# Patient Record
Sex: MALE | Race: WHITE | Employment: OTHER | URBAN - METROPOLITAN AREA
[De-identification: names, ages, dates, MRNs, and addresses within clinical notes are randomized per-mention and may not be internally consistent; named-entity substitution may affect disease eponyms.]

---

## 2018-12-11 RX ORDER — LISINOPRIL 5 MG/1
TABLET ORAL
Qty: 30 TABLET | Refills: 11 | Status: SHIPPED | OUTPATIENT
Start: 2018-12-11 | End: 2019-06-29 | Stop reason: SDUPTHER

## 2019-03-04 ENCOUNTER — OFFICE VISIT (OUTPATIENT)
Dept: CARDIOLOGY CLINIC | Age: 64
End: 2019-03-04
Payer: MEDICARE

## 2019-03-04 VITALS
HEART RATE: 68 BPM | SYSTOLIC BLOOD PRESSURE: 118 MMHG | RESPIRATION RATE: 16 BRPM | HEIGHT: 72 IN | WEIGHT: 289.6 LBS | BODY MASS INDEX: 39.22 KG/M2 | DIASTOLIC BLOOD PRESSURE: 68 MMHG

## 2019-03-04 DIAGNOSIS — E78.5 DYSLIPIDEMIA: ICD-10-CM

## 2019-03-04 DIAGNOSIS — I25.5 ISCHEMIC CARDIOMYOPATHY: ICD-10-CM

## 2019-03-04 DIAGNOSIS — I25.10 CORONARY ARTERY DISEASE INVOLVING NATIVE CORONARY ARTERY OF NATIVE HEART WITHOUT ANGINA PECTORIS: ICD-10-CM

## 2019-03-04 DIAGNOSIS — Z98.61 HISTORY OF PTCA: ICD-10-CM

## 2019-03-04 DIAGNOSIS — E66.01 MORBID OBESITY DUE TO EXCESS CALORIES (HCC): ICD-10-CM

## 2019-03-04 DIAGNOSIS — Z86.73 H/O: CVA (CEREBROVASCULAR ACCIDENT): ICD-10-CM

## 2019-03-04 DIAGNOSIS — I10 ESSENTIAL HYPERTENSION: Primary | ICD-10-CM

## 2019-03-04 DIAGNOSIS — I25.2 HISTORY OF ACUTE ANTERIOR WALL MYOCARDIAL INFARCTION: ICD-10-CM

## 2019-03-04 PROCEDURE — G8598 ASA/ANTIPLAT THER USED: HCPCS | Performed by: INTERNAL MEDICINE

## 2019-03-04 PROCEDURE — 3017F COLORECTAL CA SCREEN DOC REV: CPT | Performed by: INTERNAL MEDICINE

## 2019-03-04 PROCEDURE — 1036F TOBACCO NON-USER: CPT | Performed by: INTERNAL MEDICINE

## 2019-03-04 PROCEDURE — G8484 FLU IMMUNIZE NO ADMIN: HCPCS | Performed by: INTERNAL MEDICINE

## 2019-03-04 PROCEDURE — 99214 OFFICE O/P EST MOD 30 MIN: CPT | Performed by: INTERNAL MEDICINE

## 2019-03-04 PROCEDURE — 93000 ELECTROCARDIOGRAM COMPLETE: CPT | Performed by: INTERNAL MEDICINE

## 2019-03-04 PROCEDURE — G8427 DOCREV CUR MEDS BY ELIG CLIN: HCPCS | Performed by: INTERNAL MEDICINE

## 2019-03-04 PROCEDURE — G8417 CALC BMI ABV UP PARAM F/U: HCPCS | Performed by: INTERNAL MEDICINE

## 2019-03-04 RX ORDER — OMEPRAZOLE 20 MG/1
20 CAPSULE, DELAYED RELEASE ORAL DAILY
COMMUNITY

## 2019-03-04 RX ORDER — NITROGLYCERIN 0.4 MG/1
TABLET SUBLINGUAL
Qty: 25 TABLET | Refills: 3 | Status: SHIPPED
Start: 2019-03-04 | End: 2022-08-05 | Stop reason: SDUPTHER

## 2019-03-08 DIAGNOSIS — I25.10 CORONARY ARTERY DISEASE INVOLVING NATIVE CORONARY ARTERY OF NATIVE HEART WITHOUT ANGINA PECTORIS: ICD-10-CM

## 2019-03-08 DIAGNOSIS — I25.2 HISTORY OF ACUTE ANTERIOR WALL MYOCARDIAL INFARCTION: ICD-10-CM

## 2019-07-01 RX ORDER — LISINOPRIL 5 MG/1
TABLET ORAL
Qty: 90 TABLET | Refills: 3 | Status: SHIPPED
Start: 2019-07-01 | End: 2020-05-27

## 2019-12-09 ENCOUNTER — APPOINTMENT (OUTPATIENT)
Dept: CT IMAGING | Age: 64
End: 2019-12-09
Payer: MEDICARE

## 2019-12-09 ENCOUNTER — HOSPITAL ENCOUNTER (EMERGENCY)
Age: 64
Discharge: HOME | End: 2019-12-09
Attending: EMERGENCY MEDICINE
Payer: MEDICARE

## 2019-12-09 VITALS
HEART RATE: 77 BPM | HEIGHT: 72 IN | TEMPERATURE: 97.7 F | RESPIRATION RATE: 14 BRPM | WEIGHT: 280 LBS | OXYGEN SATURATION: 93 % | DIASTOLIC BLOOD PRESSURE: 105 MMHG | SYSTOLIC BLOOD PRESSURE: 170 MMHG | BODY MASS INDEX: 37.93 KG/M2

## 2019-12-09 DIAGNOSIS — N20.0 KIDNEY STONE: Primary | ICD-10-CM

## 2019-12-09 LAB
ALBUMIN SERPL-MCNC: 4 G/DL (ref 3.5–5.2)
ALP BLD-CCNC: 142 U/L (ref 40–129)
ALT SERPL-CCNC: 23 U/L (ref 0–40)
AMORPHOUS: ABNORMAL
ANION GAP SERPL CALCULATED.3IONS-SCNC: 11 MMOL/L (ref 7–16)
AST SERPL-CCNC: 16 U/L (ref 0–39)
BACTERIA: ABNORMAL /HPF
BASOPHILS ABSOLUTE: 0.02 E9/L (ref 0–0.2)
BASOPHILS RELATIVE PERCENT: 0.2 % (ref 0–2)
BILIRUB SERPL-MCNC: 0.8 MG/DL (ref 0–1.2)
BILIRUBIN URINE: NEGATIVE
BLOOD, URINE: ABNORMAL
BUN BLDV-MCNC: 34 MG/DL (ref 8–23)
CALCIUM SERPL-MCNC: 9.8 MG/DL (ref 8.6–10.2)
CHLORIDE BLD-SCNC: 100 MMOL/L (ref 98–107)
CLARITY: CLEAR
CO2: 30 MMOL/L (ref 22–29)
COLOR: YELLOW
CREAT SERPL-MCNC: 1 MG/DL (ref 0.7–1.2)
EOSINOPHILS ABSOLUTE: 0 E9/L (ref 0.05–0.5)
EOSINOPHILS RELATIVE PERCENT: 0 % (ref 0–6)
GFR AFRICAN AMERICAN: >60
GFR NON-AFRICAN AMERICAN: >60 ML/MIN/1.73
GLUCOSE BLD-MCNC: 171 MG/DL (ref 74–99)
GLUCOSE URINE: NEGATIVE MG/DL
HCT VFR BLD CALC: 48 % (ref 37–54)
HEMOGLOBIN: 15.8 G/DL (ref 12.5–16.5)
IMMATURE GRANULOCYTES #: 0.06 E9/L
IMMATURE GRANULOCYTES %: 0.5 % (ref 0–5)
KETONES, URINE: NEGATIVE MG/DL
LEUKOCYTE ESTERASE, URINE: NEGATIVE
LIPASE: 26 U/L (ref 13–60)
LYMPHOCYTES ABSOLUTE: 0.51 E9/L (ref 1.5–4)
LYMPHOCYTES RELATIVE PERCENT: 3.9 % (ref 20–42)
MCH RBC QN AUTO: 29.4 PG (ref 26–35)
MCHC RBC AUTO-ENTMCNC: 32.9 % (ref 32–34.5)
MCV RBC AUTO: 89.2 FL (ref 80–99.9)
MONOCYTES ABSOLUTE: 0.66 E9/L (ref 0.1–0.95)
MONOCYTES RELATIVE PERCENT: 5 % (ref 2–12)
NEUTROPHILS ABSOLUTE: 11.89 E9/L (ref 1.8–7.3)
NEUTROPHILS RELATIVE PERCENT: 90.4 % (ref 43–80)
NITRITE, URINE: NEGATIVE
PDW BLD-RTO: 12.6 FL (ref 11.5–15)
PH UA: 5.5 (ref 5–9)
PLATELET # BLD: 215 E9/L (ref 130–450)
PMV BLD AUTO: 10.2 FL (ref 7–12)
POTASSIUM REFLEX MAGNESIUM: 4.7 MMOL/L (ref 3.5–5)
PROTEIN UA: NEGATIVE MG/DL
RBC # BLD: 5.38 E12/L (ref 3.8–5.8)
RBC UA: ABNORMAL /HPF (ref 0–2)
SODIUM BLD-SCNC: 141 MMOL/L (ref 132–146)
SPECIFIC GRAVITY UA: >=1.03 (ref 1–1.03)
TOTAL PROTEIN: 7.6 G/DL (ref 6.4–8.3)
UROBILINOGEN, URINE: 0.2 E.U./DL
WBC # BLD: 13.1 E9/L (ref 4.5–11.5)
WBC UA: ABNORMAL /HPF (ref 0–5)

## 2019-12-09 PROCEDURE — 80053 COMPREHEN METABOLIC PANEL: CPT

## 2019-12-09 PROCEDURE — 99284 EMERGENCY DEPT VISIT MOD MDM: CPT

## 2019-12-09 PROCEDURE — 85025 COMPLETE CBC W/AUTO DIFF WBC: CPT

## 2019-12-09 PROCEDURE — 6360000002 HC RX W HCPCS: Performed by: STUDENT IN AN ORGANIZED HEALTH CARE EDUCATION/TRAINING PROGRAM

## 2019-12-09 PROCEDURE — 74176 CT ABD & PELVIS W/O CONTRAST: CPT

## 2019-12-09 PROCEDURE — 6370000000 HC RX 637 (ALT 250 FOR IP): Performed by: EMERGENCY MEDICINE

## 2019-12-09 PROCEDURE — 96374 THER/PROPH/DIAG INJ IV PUSH: CPT

## 2019-12-09 PROCEDURE — 6360000002 HC RX W HCPCS: Performed by: EMERGENCY MEDICINE

## 2019-12-09 PROCEDURE — 81001 URINALYSIS AUTO W/SCOPE: CPT

## 2019-12-09 PROCEDURE — 96375 TX/PRO/DX INJ NEW DRUG ADDON: CPT

## 2019-12-09 PROCEDURE — 83690 ASSAY OF LIPASE: CPT

## 2019-12-09 RX ORDER — KETOROLAC TROMETHAMINE 30 MG/ML
15 INJECTION, SOLUTION INTRAMUSCULAR; INTRAVENOUS ONCE
Status: DISCONTINUED | OUTPATIENT
Start: 2019-12-09 | End: 2019-12-09

## 2019-12-09 RX ORDER — TAMSULOSIN HYDROCHLORIDE 0.4 MG/1
0.4 CAPSULE ORAL DAILY
Qty: 7 CAPSULE | Refills: 0 | Status: SHIPPED | OUTPATIENT
Start: 2019-12-09 | End: 2020-11-03

## 2019-12-09 RX ORDER — HYDROCODONE BITARTRATE AND ACETAMINOPHEN 5; 325 MG/1; MG/1
1 TABLET ORAL ONCE
Status: COMPLETED | OUTPATIENT
Start: 2019-12-09 | End: 2019-12-09

## 2019-12-09 RX ORDER — MORPHINE SULFATE 4 MG/ML
4 INJECTION, SOLUTION INTRAMUSCULAR; INTRAVENOUS ONCE
Status: COMPLETED | OUTPATIENT
Start: 2019-12-09 | End: 2019-12-09

## 2019-12-09 RX ORDER — METOCLOPRAMIDE HYDROCHLORIDE 5 MG/ML
10 INJECTION INTRAMUSCULAR; INTRAVENOUS ONCE
Status: COMPLETED | OUTPATIENT
Start: 2019-12-09 | End: 2019-12-09

## 2019-12-09 RX ORDER — ONDANSETRON 8 MG/1
8 TABLET, ORALLY DISINTEGRATING ORAL EVERY 8 HOURS PRN
Qty: 12 TABLET | Refills: 0 | Status: SHIPPED | OUTPATIENT
Start: 2019-12-09 | End: 2020-12-08 | Stop reason: ALTCHOICE

## 2019-12-09 RX ORDER — HYDROCODONE BITARTRATE AND ACETAMINOPHEN 5; 325 MG/1; MG/1
1 TABLET ORAL EVERY 4 HOURS PRN
Qty: 18 TABLET | Refills: 0 | Status: SHIPPED | OUTPATIENT
Start: 2019-12-09 | End: 2019-12-12

## 2019-12-09 RX ORDER — ONDANSETRON 2 MG/ML
4 INJECTION INTRAMUSCULAR; INTRAVENOUS ONCE
Status: COMPLETED | OUTPATIENT
Start: 2019-12-09 | End: 2019-12-09

## 2019-12-09 RX ADMIN — HYDROCODONE BITARTRATE AND ACETAMINOPHEN 1 TABLET: 5; 325 TABLET ORAL at 05:06

## 2019-12-09 RX ADMIN — MORPHINE SULFATE 4 MG: 4 INJECTION, SOLUTION INTRAMUSCULAR; INTRAVENOUS at 04:06

## 2019-12-09 RX ADMIN — ONDANSETRON 4 MG: 2 INJECTION INTRAMUSCULAR; INTRAVENOUS at 04:06

## 2019-12-09 RX ADMIN — METOCLOPRAMIDE 10 MG: 5 INJECTION, SOLUTION INTRAMUSCULAR; INTRAVENOUS at 06:08

## 2019-12-09 ASSESSMENT — ENCOUNTER SYMPTOMS
VOMITING: 1
DIARRHEA: 0
PHOTOPHOBIA: 0
SHORTNESS OF BREATH: 0
HEMATOCHEZIA: 0
CHEST TIGHTNESS: 0
COUGH: 0
ABDOMINAL PAIN: 1
NAUSEA: 1
WHEEZING: 0
HEMATEMESIS: 0
CONSTIPATION: 0

## 2019-12-09 ASSESSMENT — PAIN DESCRIPTION - FREQUENCY: FREQUENCY: CONTINUOUS

## 2019-12-09 ASSESSMENT — PAIN DESCRIPTION - DIRECTION: RADIATING_TOWARDS: L FLANK

## 2019-12-09 ASSESSMENT — PAIN SCALES - GENERAL
PAINLEVEL_OUTOF10: 8
PAINLEVEL_OUTOF10: 4
PAINLEVEL_OUTOF10: 4
PAINLEVEL_OUTOF10: 8
PAINLEVEL_OUTOF10: 4
PAINLEVEL_OUTOF10: 4

## 2019-12-09 ASSESSMENT — PAIN DESCRIPTION - ONSET: ONSET: SUDDEN

## 2019-12-09 ASSESSMENT — PAIN DESCRIPTION - PAIN TYPE: TYPE: ACUTE PAIN

## 2019-12-09 ASSESSMENT — PAIN DESCRIPTION - LOCATION: LOCATION: ABDOMEN

## 2019-12-09 ASSESSMENT — PAIN DESCRIPTION - ORIENTATION: ORIENTATION: LOWER

## 2019-12-09 ASSESSMENT — PAIN DESCRIPTION - PROGRESSION: CLINICAL_PROGRESSION: GRADUALLY WORSENING

## 2019-12-09 ASSESSMENT — PAIN DESCRIPTION - DESCRIPTORS: DESCRIPTORS: ACHING;CONSTANT;DISCOMFORT

## 2020-03-20 ENCOUNTER — OFFICE VISIT (OUTPATIENT)
Dept: CARDIOLOGY CLINIC | Age: 65
End: 2020-03-20
Payer: MEDICARE

## 2020-03-20 VITALS
WEIGHT: 292.2 LBS | RESPIRATION RATE: 20 BRPM | HEART RATE: 60 BPM | DIASTOLIC BLOOD PRESSURE: 62 MMHG | BODY MASS INDEX: 39.58 KG/M2 | SYSTOLIC BLOOD PRESSURE: 120 MMHG | HEIGHT: 72 IN

## 2020-03-20 PROCEDURE — 93000 ELECTROCARDIOGRAM COMPLETE: CPT | Performed by: INTERNAL MEDICINE

## 2020-03-20 PROCEDURE — 99214 OFFICE O/P EST MOD 30 MIN: CPT | Performed by: INTERNAL MEDICINE

## 2020-03-20 NOTE — PROGRESS NOTES
OFFICE VISIT        PRIMARY CARE PHYSICIAN:    Krishan Meade MD       ALLERGIES / SENSITIVITIES:    No Known Allergies       REVIEWED MEDICATIONS:      Current Outpatient Medications:     FIBER PO, Take by mouth daily, Disp: , Rfl:     ondansetron (ZOFRAN ODT) 8 MG TBDP disintegrating tablet, Take 1 tablet by mouth every 8 hours as needed for Nausea, Disp: 12 tablet, Rfl: 0    lisinopril (PRINIVIL;ZESTRIL) 5 MG tablet, TAKE 1 TABLET BY MOUTH DAILY, Disp: 90 tablet, Rfl: 3    omeprazole (PRILOSEC) 20 MG delayed release capsule, Take 20 mg by mouth daily, Disp: , Rfl:     nitroGLYCERIN (NITROSTAT) 0.4 MG SL tablet, Dissolve 1 tablet under tongue for chest pain and repeat every 5 min up to max of 3 total doses. If no relief after 3 doses call 911, Disp: 25 tablet, Rfl: 3    atorvastatin (LIPITOR) 80 MG tablet, Take 1 tablet by mouth nightly, Disp: 30 tablet, Rfl: 3    metoprolol succinate (TOPROL XL) 25 MG extended release tablet, Take 1 tablet by mouth daily, Disp: 30 tablet, Rfl: 3    citalopram (CELEXA) 20 MG tablet, Take 20 mg by mouth nightly, Disp: , Rfl:     aspirin 81 MG chewable tablet, Take 81 mg by mouth daily , Disp: , Rfl:     Omega-3 Fatty Acids (FISH OIL) 600 MG CAPS, Take 1,200 mg by mouth 2 times daily , Disp: , Rfl:     Ascorbic Acid (VITAMIN C) 500 MG tablet, Take 500 mg by mouth daily , Disp: , Rfl:     tamsulosin (FLOMAX) 0.4 MG capsule, Take 1 capsule by mouth daily (Patient not taking: Reported on 3/20/2020), Disp: 7 capsule, Rfl: 0      S: REASON FOR VISIT:    Coronary artery disease/ischemic cardiomyopathy. [facility] is a 73-year-old male with cardiovascular history as described below. He is morbidly obese and has gained more weight. He is reporting right knee discomfort from reportedly torn right knee meniscus, which he had injected at [facility] without any significant improvement. He was in the Emergency Room in December with a kidney stone. He denies chest pain.   He  No S3 or S4.  No murmurs or rubs. Abdomen:                    Soft, nontender without organomegaly or masses.  No bruits.  Normal bowel sounds. Extremities:                 One plus edema.  Pulses normal.  Skin:                            Normal turgor.  No rashes or ulcers noted. Neurologic:                  Oriented x3.  No motor or sensory deficits detected. REVIEW OF DIAGNOSTIC TESTS:    1. Blood tests from 12/9/2019 reviewed:  Lipase normal.  Glucose 171. BUN 34, creatinine 1.0, GFR > 60, potassium 4.7. ALT and AST normal.  WBC 13.1, hemoglobin 13.8, hematocrit 48.0. Platelet count 132.    2.  EKG done today showed sinus bradycardia with a heart rate of 59 bpm with RSR prime in V1, but was otherwise unremarkable. ASSESSMENT / DIAGNOSIS:   1.  Coronary artery disease/ischemic cardiomyopathy:  Clinically stable with no angina or signs/symptoms of CHF. 2.  Morbid obesity, with continued weight gain. 3.  Hypertension: Adequately controlled. 4.  History of CVA. 5.  History of low HDL:  On statin therapy. 6.  Right knee torn meniscus with right knee discomfort with history of bilateral knee surgeries in the remote past.  7.  History of kidney stones. 8.  History of depression.          TREATMENT PLAN:  1. Reassure. 2.  May discontinue Brilinta. 3.  Patient strongly advised aerobic exercise, watching diet and attempt to lose weight. 4.  Consider a myocardial perfusion imaging study for follow up coronary artery disease: Will defer for now since patient is asymptomatic and in view of the ongoing problems with the Corona virus/Covid 19.  5.  Follow up with Cardiology in 1 year or on a prn basis. Kemar Puckett.  [facility] [address]  [phone]

## 2020-05-27 RX ORDER — LISINOPRIL 5 MG/1
TABLET ORAL
Qty: 90 TABLET | Refills: 3 | Status: SHIPPED
Start: 2020-05-27 | End: 2021-03-29 | Stop reason: SDUPTHER

## 2020-06-03 ENCOUNTER — HOSPITAL ENCOUNTER (OUTPATIENT)
Dept: NUCLEAR MEDICINE | Age: 65
Discharge: HOME | End: 2020-06-03
Payer: MEDICARE

## 2020-06-03 PROCEDURE — 78014 THYROID IMAGING W/BLOOD FLOW: CPT

## 2020-06-03 PROCEDURE — A9517 I131 IODIDE CAP, RX: HCPCS | Performed by: RADIOLOGY

## 2020-06-03 PROCEDURE — 3430000000 HC RX DIAGNOSTIC RADIOPHARMACEUTICAL: Performed by: RADIOLOGY

## 2020-06-03 RX ADMIN — SODIUM IODIDE I 131 15 MICRO CURIE: 1 CAPSULE, GELATIN COATED ORAL at 11:30

## 2020-06-04 ENCOUNTER — HOSPITAL ENCOUNTER (OUTPATIENT)
Dept: NUCLEAR MEDICINE | Age: 65
Discharge: HOME | End: 2020-06-04
Payer: MEDICARE

## 2020-06-04 PROCEDURE — 3430000000 HC RX DIAGNOSTIC RADIOPHARMACEUTICAL: Performed by: RADIOLOGY

## 2020-06-04 PROCEDURE — A9512 TC99M PERTECHNETATE: HCPCS | Performed by: RADIOLOGY

## 2020-06-04 RX ADMIN — Medication 10 MILLICURIE: at 11:54

## 2020-10-12 ENCOUNTER — HOSPITAL ENCOUNTER (OUTPATIENT)
Dept: ULTRASOUND IMAGING | Age: 65
Discharge: HOME | End: 2020-10-14
Payer: MEDICARE

## 2020-10-12 PROCEDURE — 76536 US EXAM OF HEAD AND NECK: CPT

## 2020-10-22 ENCOUNTER — HOSPITAL ENCOUNTER (OUTPATIENT)
Dept: SLEEP CENTER | Age: 65
Discharge: HOME | End: 2020-10-22
Payer: MEDICARE

## 2020-10-22 PROCEDURE — G0399 HOME SLEEP TEST/TYPE 3 PORTA: HCPCS

## 2020-10-24 NOTE — PROGRESS NOTES
[address] [address]                               SLEEP STUDY REPORT    PATIENT NAME: Zackary Kaufman                 :        1955  MED REC NO:   [id number]                            ROOM:  ACCOUNT NO:   [de-identified]                           ADMIT DATE: 10/22/2020  PROVIDER:     Britney White MD    DATE OF STUDY:  10/22/2020    REFERRING PROVIDER:  Thompson Shen NP    STUDY PERFORMED:  Polysomnography. INDICATION FOR STUDY:  Loud snoring, restless sleep, and morning  headaches. CURRENT MEDICATIONS:  Metoprolol, atorvastatin, lisinopril, citalopram,  Prilosec, and vitamins. INTERPRETATION:  This polysomnogram was performed as a home sleep apnea  test.  An [facility] monitoring device was utilized for the study. Total recording time was 543 minutes. RESPIRATION SUMMARY:  APNEA:  There were 85 apneic events, which were all obstructive, five  occurred in the supine position and maximum duration was 50 seconds. HYPOPNEA:  There were 229 hypopneic events, 29 occurred in the supine  position and maximum duration was 24 seconds. RESPIRATORY EVENT INDEX:  The respiratory event index is 36. OXYGEN SATURATION:  Average oxygen saturation was 87%. Lowest  saturation was 71%. The patient spent 63% of the time with saturation  less than 90%. One percent of the time, saturation was less than 80%. HEART RATE SUMMARY:  Average heart rate during sleep was 66 beats per  minute. Maximum heart rate was 135 beats per minute and minimum heart  rate was 54 beats per minute. SNORE SUMMARY:  There were 39 snore episodes, this amounted to  approximately 1% of the night snoring. MISCELLANEOUS:  [place] Sleepiness Scale score is 13/24. BMI is 37.97  kg per meter squared. Neck circumference is 19.5 inches. IMPRESSION:  1. Severe obstructive sleep apnea. 2.  Mild snoring.   3.  Nocturnal hypoxia. PLAN:  1. Auto-CPAP at 5 to 15 cm of water pressure. 2.  The patient to be seen in 6 to 10 weeks. 3.  When the patient is seen, if symptoms have not resolved or if CPAP  download is abnormal, the patient will be referred back to [address] [place] [address] [address] for a full night of titration.         David Miller MD  Diplomat of Sleep Medicine    D: 10/23/2020 15:40:17       T: 10/23/2020 15:51:28     [email or web address]  Job#: [id number]     Doc#: [id number]    CC:

## 2020-11-03 ENCOUNTER — HOSPITAL ENCOUNTER (OUTPATIENT)
Dept: ULTRASOUND IMAGING | Age: 65
Discharge: HOME | End: 2020-11-05
Payer: MEDICARE

## 2020-11-03 VITALS
RESPIRATION RATE: 16 BRPM | TEMPERATURE: 98.6 F | OXYGEN SATURATION: 95 % | SYSTOLIC BLOOD PRESSURE: 142 MMHG | WEIGHT: 280 LBS | HEART RATE: 60 BPM | HEIGHT: 72 IN | BODY MASS INDEX: 37.93 KG/M2 | DIASTOLIC BLOOD PRESSURE: 80 MMHG

## 2020-11-03 PROCEDURE — 88173 CYTOPATH EVAL FNA REPORT: CPT

## 2020-11-03 PROCEDURE — 21550 BIOPSY OF NECK/CHEST: CPT

## 2020-11-03 PROCEDURE — 10005 FNA BX W/US GDN 1ST LES: CPT

## 2020-11-03 PROCEDURE — 2500000003 HC RX 250 WO HCPCS: Performed by: RADIOLOGY

## 2020-11-03 PROCEDURE — 88305 TISSUE EXAM BY PATHOLOGIST: CPT

## 2020-11-03 RX ORDER — LIDOCAINE HYDROCHLORIDE 10 MG/ML
5 INJECTION, SOLUTION INFILTRATION; PERINEURAL ONCE
Status: COMPLETED | OUTPATIENT
Start: 2020-11-03 | End: 2020-11-03

## 2020-11-03 RX ADMIN — LIDOCAINE HYDROCHLORIDE 10 ML: 10 INJECTION, SOLUTION INFILTRATION; PERINEURAL at 09:19

## 2020-11-03 ASSESSMENT — PAIN - FUNCTIONAL ASSESSMENT
PAIN_FUNCTIONAL_ASSESSMENT: 0-10
PAIN_FUNCTIONAL_ASSESSMENT: 0-10

## 2020-11-03 ASSESSMENT — PAIN SCALES - GENERAL: PAINLEVEL_OUTOF10: 0

## 2020-11-03 NOTE — PROGRESS NOTES
IRFLOWSHEET    Date: 11/3/2020    Time: 8:55 AM     Exam: ultrasound guided thyroid biopsy    Radiologist Performing Procedure: Dr Preston Turpin    Nurse Reporting/Phone: [phone]     Nurse Receiving: Beverley Samano    Permit signed:      Yes    ID Band Checklist: Yes    Allergies: Patient has no known allergies. TIME OUT: 0913    PROCEDURE START TIME: 7742    Puncture Site: right and left    Puncture Time: right @ 0919    Catheters: 25 gauge x 1.5 in    LABS:   Lab Results   Component Value Date    INR 1.1 11/11/2016    PROTIME 12.5 (H) 11/11/2016           Lab Results   Component Value Date    CREATININE 1.0 12/09/2019    BUN 34 (H) 12/09/2019          Lab Results   Component Value Date    HGB 15.8 12/09/2019    HCT 48.0 12/09/2019     12/09/2019         PROCEDURE END TIME: 0942    Total Contrast: na    Fluoroscopy Time: na    Complications:  None    Comments:  Patient arrival from home to ultrasound for thyroid biopsy. Consent signed, Dr. Preston Turpin in to speak with the patient about the procedure. Patient placed supine on cart, right neck prepped and draped. Using ultrasound, needle inserted to right neck 0919 and fine needle aspiration x 5 taken by Dr. Preston Turpin and given directly to pathologist completed at [place] [address]. Left side cleansed and prepped and draped. Using lidocaine for numbing using ultrasound  needle inserted left thyroid@ 0936 and fine needle aspiration taken x5 and given to pathologist@ 0941 Procedure completed @ 0942. Site cleansed and band aid applied. No complications noted. Discharge instructions reviewed and understood by patient, patient discharged home.

## 2021-03-29 RX ORDER — LISINOPRIL 5 MG/1
TABLET ORAL
Qty: 90 TABLET | Refills: 3 | Status: SHIPPED
Start: 2021-03-29 | End: 2022-01-03 | Stop reason: SDUPTHER

## 2021-05-10 ENCOUNTER — OFFICE VISIT (OUTPATIENT)
Dept: CARDIOLOGY CLINIC | Age: 66
End: 2021-05-10
Payer: MEDICARE

## 2021-05-10 VITALS
BODY MASS INDEX: 40.55 KG/M2 | SYSTOLIC BLOOD PRESSURE: 124 MMHG | HEART RATE: 73 BPM | WEIGHT: 299.4 LBS | DIASTOLIC BLOOD PRESSURE: 66 MMHG | RESPIRATION RATE: 20 BRPM | HEIGHT: 72 IN

## 2021-05-10 DIAGNOSIS — I25.10 CORONARY ARTERY DISEASE INVOLVING NATIVE CORONARY ARTERY OF NATIVE HEART WITHOUT ANGINA PECTORIS: Primary | ICD-10-CM

## 2021-05-10 DIAGNOSIS — E66.01 MORBID OBESITY DUE TO EXCESS CALORIES (HCC): ICD-10-CM

## 2021-05-10 DIAGNOSIS — Z99.89 OSA ON CPAP: ICD-10-CM

## 2021-05-10 DIAGNOSIS — I25.2 HISTORY OF ACUTE ANTERIOR WALL MI: ICD-10-CM

## 2021-05-10 DIAGNOSIS — I25.5 ISCHEMIC CARDIOMYOPATHY: ICD-10-CM

## 2021-05-10 DIAGNOSIS — Z87.442 HISTORY OF KIDNEY STONES: ICD-10-CM

## 2021-05-10 DIAGNOSIS — Z98.61 HISTORY OF PTCA: ICD-10-CM

## 2021-05-10 DIAGNOSIS — E78.5 DYSLIPIDEMIA: ICD-10-CM

## 2021-05-10 DIAGNOSIS — G47.33 OSA ON CPAP: ICD-10-CM

## 2021-05-10 DIAGNOSIS — Z86.59 H/O: DEPRESSION: ICD-10-CM

## 2021-05-10 DIAGNOSIS — M17.0 PRIMARY OSTEOARTHRITIS OF BOTH KNEES: ICD-10-CM

## 2021-05-10 DIAGNOSIS — I10 ESSENTIAL HYPERTENSION: ICD-10-CM

## 2021-05-10 DIAGNOSIS — Z86.73 H/O: CVA (CEREBROVASCULAR ACCIDENT): ICD-10-CM

## 2021-05-10 PROCEDURE — 93000 ELECTROCARDIOGRAM COMPLETE: CPT | Performed by: INTERNAL MEDICINE

## 2021-05-10 PROCEDURE — 99214 OFFICE O/P EST MOD 30 MIN: CPT | Performed by: INTERNAL MEDICINE

## 2021-05-10 NOTE — PROGRESS NOTES
OFFICE VISIT        PRIMARY CARE PHYSICIAN:    No primary care provider on file. ALLERGIES / SENSITIVITIES:    Allergies   Allergen Reactions    No Known Allergies           REVIEWED MEDICATIONS:      Current Outpatient Medications:     lisinopril (PRINIVIL;ZESTRIL) 5 MG tablet, One tablet daily, Disp: 90 tablet, Rfl: 3    FIBER PO, Take by mouth as needed , Disp: , Rfl:     omeprazole (PRILOSEC) 20 MG delayed release capsule, Take 20 mg by mouth daily, Disp: , Rfl:     atorvastatin (LIPITOR) 80 MG tablet, Take 1 tablet by mouth nightly, Disp: 30 tablet, Rfl: 3    metoprolol succinate (TOPROL XL) 25 MG extended release tablet, Take 1 tablet by mouth daily, Disp: 30 tablet, Rfl: 3    citalopram (CELEXA) 20 MG tablet, Take 20 mg by mouth nightly, Disp: , Rfl:     aspirin 81 MG chewable tablet, Take 81 mg by mouth daily , Disp: , Rfl:     Omega-3 Fatty Acids (FISH OIL) 600 MG CAPS, Take 1,200 mg by mouth 2 times daily , Disp: , Rfl:     Ascorbic Acid (VITAMIN C) 500 MG tablet, Take 500 mg by mouth daily , Disp: , Rfl:     nitroGLYCERIN (NITROSTAT) 0.4 MG SL tablet, Dissolve 1 tablet under tongue for chest pain and repeat every 5 min up to max of 3 total doses. If no relief after 3 doses call 911 (Patient not taking: Reported on 12/8/2020), Disp: 25 tablet, Rfl: 3      S: REASON FOR VISIT:    Coronary artery disease/ischemic cardiomyopathy. Lesli Kaur is a 71-year-old male with cardiovascular history as described below. He is morbidly obese and has gained more weight. He continues to complain of right knee discomfort from reportedly torn meniscus. He was diagnosed with severe obstructive sleep apnea in 10/2020 and is wearing CPAP. He denies chest pain. He reports occasional sensation of feeling his heart beating hard in his chest.  He continues to report exertional dyspnea climbing up 2-3 flights of stairs or when carrying heavy loads, but this has not worsened. He denies orthopnea or PND's.   He denies any significant/worsening lower extremity swelling. He denies actual palpitations. He denies dizziness, presyncope or syncope. REVIEW OF SYSTEMS:    CONSTITUTIONAL:  Denies fevers, chills, night sweats or fatigue. HEENT:  Denies headaches.  Denies changes in hearing or vision.  Denies dysphagia, hoarseness, hemoptysis, hematemesis or epistaxis. ENDOCRINE:  Denies polyphagia, polydipsia or polyuria.  Denies heat or cold intolerance. MUSCULOSKELETAL:  He complains of right knee discomfort. SKIN:  Denies any rashes, ulcers or itching. HEME/LYMPH:  Denies any palpable lymph nodes, bleeding or easy bruisability. HEART:  As above. LUNGS:  Denies any sputum production. GI: Denies abdominal pain, nausea, vomiting, diarrhea, constipation, rectal bleeding or tarry stools. :  Denies hematuria or dysuria. PSYCHIATRIC:  Denies mood changes, anxiety or depression (He is on Celexa). NEUROLOGIC:  Denies memory loss, motor weakness, numbness, tingling or tremors.                    CARDIOVASCULAR HISTORY:    1.  Morbid obesity. 2.  Hypertension. 3.  History of CVA x2 approximately 10 years ago. 4.  Remote history of tobacco abuse (10 pack year). 5.  Coronary artery disease:    a.  Anterior wall STEMI 11/11/2016.    b.  11/12/2016 cardiac catheterization/angioplasty:  Left main no significant disease.  LAD 95% thrombotic subtotal proximal vessel occlusion.  LCX long 50% disease of a large 1st high obtuse marginal branch.   RCA dominant vessel with no significant disease.  Normal left ventricular size with anterolateral hypokinesis with borderline reduced global left ventricular systolic function with an estimated ejection fraction of 50%.  Elevated LVEDP consistent with LV diastolic dysfunction.  Successful balloon angioplasty with the deployment of a drug-eluting coronary stent to the proximal LAD with very good results.    6.  Echocardiogram done on 11/12/2016 showed a mildly dilated left Skin:                            Normal turgor.  No rashes or ulcers noted. Neurologic:                  Oriented x3.  No motor or sensory deficits detected. REVIEW OF DIAGNOSTIC TESTS:    1. Blood tests from 10/19/2020 reviewed:  CBC unremarkable. BUN 20, creatinine 0.77, GFR 96, potassium 4.7, LFT's unremarkable. Total cholesterol 94, HDL 26, LDL 47, triglycerides 120, TSH 0.01, free T4 1.3.  2. EKG done today showed sinus rhythm with RSR' in V1, but was otherwise unremarkable. ASSESSMENT / DIAGNOSIS:   1.  Coronary artery disease/ischemic cardiomyopathy:  Clinically stable with no angina or signs/symptoms of CHF. 2.  Morbid obesity, with continued weight gain. 3.  Hypertension: Adequately controlled. 4.  History of CVA. 5.  History of low HDL:  On statin therapy. 6.  Right knee torn meniscus with right knee discomfort with history of bilateral knee surgeries in the remote past.  7.  History of kidney stones. 8.  History of depression. 9.  Reported severe obstructive sleep apnea on sleep study in 10/2020, compliant with CPAP. TREATMENT PLAN:  1. Reassure. 2.  Continue current cardiac medications. 3.  Patient again strongly advised aerobic exercise, watching diet very carefully and losing a significant amount of weight. 4.  Treadmill nuclear stress test for follow up coronary artery disease. 5.  Follow up with Cardiology in 1 year or on a prn basis pending the results of the stress test.          [address] [place] [address] [place] [id number] [phone]

## 2021-05-28 ENCOUNTER — TELEPHONE (OUTPATIENT)
Dept: CARDIOLOGY | Age: 66
End: 2021-05-28

## 2021-05-28 NOTE — TELEPHONE ENCOUNTER
Patient is cancelling stress test at this time. Will call back to reschedule for August, due to change in insurance.

## 2021-07-29 ENCOUNTER — TELEPHONE (OUTPATIENT)
Dept: CARDIOLOGY | Age: 66
End: 2021-07-29

## 2021-08-02 ENCOUNTER — TELEPHONE (OUTPATIENT)
Dept: CARDIOLOGY | Age: 66
End: 2021-08-02

## 2021-08-04 ENCOUNTER — TELEPHONE (OUTPATIENT)
Dept: CARDIOLOGY | Age: 66
End: 2021-08-04

## 2021-08-04 NOTE — TELEPHONE ENCOUNTER
8-4-21 @ 6450. Called and spoke with the patient with a reminder regarding his stress test on 8-6-21 @ 3948. Guille Huggins Reviewed instructions including to hold his Toprol/Metoprolol for 24 hours before his test but bring it with him. Also reviewed Covid checklist. Patient voiced understanding to all instructions. Patient also says he has [facility] as his primary insurance and [employer] as his secondary. Norah Lopez notified.   Rosa Clark RN  [facility]

## 2021-08-06 ENCOUNTER — HOSPITAL ENCOUNTER (OUTPATIENT)
Dept: CARDIOLOGY | Age: 66
Discharge: HOME | End: 2021-08-06
Payer: MEDICARE

## 2021-08-06 VITALS
OXYGEN SATURATION: 96 % | BODY MASS INDEX: 40.5 KG/M2 | SYSTOLIC BLOOD PRESSURE: 122 MMHG | DIASTOLIC BLOOD PRESSURE: 60 MMHG | HEART RATE: 71 BPM | HEIGHT: 72 IN | WEIGHT: 299 LBS

## 2021-08-06 LAB
LV EF: 73 %
LVEF MODALITY: NORMAL

## 2021-08-06 PROCEDURE — 78452 HT MUSCLE IMAGE SPECT MULT: CPT

## 2021-08-06 PROCEDURE — A9502 TC99M TETROFOSMIN: HCPCS | Performed by: INTERNAL MEDICINE

## 2021-08-06 PROCEDURE — 3430000000 HC RX DIAGNOSTIC RADIOPHARMACEUTICAL: Performed by: INTERNAL MEDICINE

## 2021-08-06 PROCEDURE — 93017 CV STRESS TEST TRACING ONLY: CPT

## 2021-08-06 PROCEDURE — 2580000003 HC RX 258: Performed by: INTERNAL MEDICINE

## 2021-08-06 RX ORDER — SODIUM CHLORIDE 0.9 % (FLUSH) 0.9 %
10 SYRINGE (ML) INJECTION PRN
Status: DISCONTINUED | OUTPATIENT
Start: 2021-08-06 | End: 2021-08-07 | Stop reason: HOSPADM

## 2021-08-06 RX ADMIN — TETROFOSMIN 11 MILLICURIE: 0.23 INJECTION, POWDER, LYOPHILIZED, FOR SOLUTION INTRAVENOUS at 08:49

## 2021-08-06 RX ADMIN — SODIUM CHLORIDE, PRESERVATIVE FREE 10 ML: 5 INJECTION INTRAVENOUS at 10:02

## 2021-08-06 RX ADMIN — TETROFOSMIN 35 MILLICURIE: 0.23 INJECTION, POWDER, LYOPHILIZED, FOR SOLUTION INTRAVENOUS at 10:01

## 2021-08-06 RX ADMIN — SODIUM CHLORIDE, PRESERVATIVE FREE 10 ML: 5 INJECTION INTRAVENOUS at 08:49

## 2021-08-06 NOTE — PROCEDURES
[address] [address] [facility] - [address]. MIGUEL [facility], [address] [place] [address]  [phone]                  Exercise Stress Nuclear Gated SPECT Study    Name: [address] [facility] [address] Account Number: [de-identified]    :  1955      Sex: male              Date of Study:  2021    Height: 6' (182.9 cm)  Weight: 299 lb (135.6 kg)     Ordering Provider: Dominic Dwyer MD           PCP: Tahmina Carty DO      Cardiologist: Dominic Dwyer MD                         Interpreting Physician: Dexter Man MD   _________________________________________________________________________________    Indication:   Evaluation of extent and severity of coronary artery disease    Clinical History:   Patient has prior history of coronary artery disease. Resting ECG:    Sinus rhythm at 71 bpm, low voltage in early transition. Exercise: The patient exercised using a Cesar protocol, completing 5:53 minutes and reaching an estimated work load of 7.0 metabolic equivalents (METS). Resting HR was 71. Peak exercise heart rate was 133 ( 85% of maximum predicted heart rate for age). Baseline /60. [place] [address]. The blood pressure response to exercise was normal      Exercise was terminated due to heart rate attained, progressive dyspnea and fatigue    The patient experienced no chest pain with exercise but was extremely short of breath with wheezing        Pulse oximetry was used to monitor oxygen saturation during the stress test.  The study was performed on Room Air. The resting pulse oximeter was 96%. The post stress O2 saturation seen during exercise was 85 % but he returned to baseline of 94% after about a minute of recovery         Exercise ECG:   The patient demonstrated no arrhythmias during exercise. No ischemia was noted.       IMAGING: Myocardial perfusion imaging was performed at rest 30-35 minutes following the intravenous injection of 11 mCi of (Tc-tetrofosmin) followed

## 2021-10-23 ENCOUNTER — HOSPITAL ENCOUNTER (OUTPATIENT)
Dept: ULTRASOUND IMAGING | Age: 66
Discharge: HOME | End: 2021-10-25
Payer: MEDICARE

## 2021-10-23 DIAGNOSIS — E04.2 NONTOXIC MULTINODULAR GOITER: ICD-10-CM

## 2021-10-23 PROCEDURE — 76536 US EXAM OF HEAD AND NECK: CPT

## 2022-01-03 RX ORDER — METOPROLOL SUCCINATE 25 MG/1
25 TABLET, EXTENDED RELEASE ORAL DAILY
Qty: 30 TABLET | Refills: 3 | Status: SHIPPED | OUTPATIENT
Start: 2022-01-03

## 2022-01-03 RX ORDER — LISINOPRIL 5 MG/1
TABLET ORAL
Qty: 90 TABLET | Refills: 3 | Status: SHIPPED | OUTPATIENT
Start: 2022-01-03

## 2022-07-02 ENCOUNTER — APPOINTMENT (OUTPATIENT)
Dept: ULTRASOUND IMAGING | Age: 67
End: 2022-07-02
Payer: MEDICARE

## 2022-07-02 ENCOUNTER — HOSPITAL ENCOUNTER (EMERGENCY)
Age: 67
Discharge: HOME | End: 2022-07-02
Attending: STUDENT IN AN ORGANIZED HEALTH CARE EDUCATION/TRAINING PROGRAM
Payer: MEDICARE

## 2022-07-02 VITALS
SYSTOLIC BLOOD PRESSURE: 140 MMHG | DIASTOLIC BLOOD PRESSURE: 72 MMHG | RESPIRATION RATE: 16 BRPM | WEIGHT: 290 LBS | TEMPERATURE: 98.6 F | BODY MASS INDEX: 39.28 KG/M2 | HEART RATE: 74 BPM | OXYGEN SATURATION: 95 % | HEIGHT: 72 IN

## 2022-07-02 DIAGNOSIS — I82.451 ACUTE DEEP VEIN THROMBOSIS (DVT) OF RIGHT PERONEAL VEIN (HCC): Primary | ICD-10-CM

## 2022-07-02 LAB
ANION GAP SERPL CALCULATED.3IONS-SCNC: 9 MMOL/L (ref 7–16)
BASOPHILS ABSOLUTE: 0.04 E9/L (ref 0–0.2)
BASOPHILS RELATIVE PERCENT: 0.6 % (ref 0–2)
BUN BLDV-MCNC: 19 MG/DL (ref 6–23)
CALCIUM SERPL-MCNC: 9.4 MG/DL (ref 8.6–10.2)
CHLORIDE BLD-SCNC: 103 MMOL/L (ref 98–107)
CO2: 26 MMOL/L (ref 22–29)
CREAT SERPL-MCNC: 0.7 MG/DL (ref 0.7–1.2)
EOSINOPHILS ABSOLUTE: 0.16 E9/L (ref 0.05–0.5)
EOSINOPHILS RELATIVE PERCENT: 2.4 % (ref 0–6)
GFR AFRICAN AMERICAN: >60
GFR NON-AFRICAN AMERICAN: >60 ML/MIN/1.73
GLUCOSE BLD-MCNC: 113 MG/DL (ref 74–99)
HCT VFR BLD CALC: 46.5 % (ref 37–54)
HEMOGLOBIN: 15.4 G/DL (ref 12.5–16.5)
IMMATURE GRANULOCYTES #: 0.02 E9/L
IMMATURE GRANULOCYTES %: 0.3 % (ref 0–5)
LYMPHOCYTES ABSOLUTE: 0.96 E9/L (ref 1.5–4)
LYMPHOCYTES RELATIVE PERCENT: 14.5 % (ref 20–42)
MCH RBC QN AUTO: 29.6 PG (ref 26–35)
MCHC RBC AUTO-ENTMCNC: 33.1 % (ref 32–34.5)
MCV RBC AUTO: 89.4 FL (ref 80–99.9)
MONOCYTES ABSOLUTE: 0.76 E9/L (ref 0.1–0.95)
MONOCYTES RELATIVE PERCENT: 11.4 % (ref 2–12)
NEUTROPHILS ABSOLUTE: 4.7 E9/L (ref 1.8–7.3)
NEUTROPHILS RELATIVE PERCENT: 70.8 % (ref 43–80)
PDW BLD-RTO: 12.8 FL (ref 11.5–15)
PLATELET # BLD: 184 E9/L (ref 130–450)
PMV BLD AUTO: 9.5 FL (ref 7–12)
POTASSIUM REFLEX MAGNESIUM: 4.2 MMOL/L (ref 3.5–5)
RBC # BLD: 5.2 E12/L (ref 3.8–5.8)
SODIUM BLD-SCNC: 138 MMOL/L (ref 132–146)
WBC # BLD: 6.6 E9/L (ref 4.5–11.5)

## 2022-07-02 PROCEDURE — 85025 COMPLETE CBC W/AUTO DIFF WBC: CPT

## 2022-07-02 PROCEDURE — 99284 EMERGENCY DEPT VISIT MOD MDM: CPT

## 2022-07-02 PROCEDURE — 80048 BASIC METABOLIC PNL TOTAL CA: CPT

## 2022-07-02 PROCEDURE — 6370000000 HC RX 637 (ALT 250 FOR IP): Performed by: STUDENT IN AN ORGANIZED HEALTH CARE EDUCATION/TRAINING PROGRAM

## 2022-07-02 PROCEDURE — 93971 EXTREMITY STUDY: CPT

## 2022-07-02 RX ADMIN — APIXABAN 10 MG: 5 TABLET, FILM COATED ORAL at 13:15

## 2022-07-02 ASSESSMENT — PAIN - FUNCTIONAL ASSESSMENT: PAIN_FUNCTIONAL_ASSESSMENT: 0-10

## 2022-07-02 NOTE — ED PROVIDER NOTES
Simon Kemp is a 73year old male with PMH of HTN, CAD, HLD who presented to ED with concern for swelling and pain to the RLE, patient has had symptoms noticed for about one week and unchanged. Symptoms worse with ambulation and improve with rest. Patient is not having chest pain or shortness of breath. Symptoms are moderate in severity and constant. The history is provided by the patient and medical records. Review of Systems   Constitutional: Negative for chills, diaphoresis, fatigue and fever. Eyes: Negative for photophobia and visual disturbance. Respiratory: Negative for cough, chest tightness and shortness of breath. Cardiovascular: Positive for leg swelling. Negative for chest pain and palpitations. Gastrointestinal: Negative for abdominal distention, abdominal pain, diarrhea, nausea and vomiting. Genitourinary: Negative for dysuria. Musculoskeletal: Negative for back pain, neck pain and neck stiffness. Skin: Negative for pallor and rash. Neurological: Negative for headaches. Psychiatric/Behavioral: Negative for confusion. Physical Exam  Vitals and nursing note reviewed. Constitutional:       General: He is not in acute distress. Appearance: Normal appearance. He is not ill-appearing. HENT:      Head: Normocephalic and atraumatic. Eyes:      General: No scleral icterus. Conjunctiva/sclera: Conjunctivae normal.      Pupils: Pupils are equal, round, and reactive to light. Cardiovascular:      Rate and Rhythm: Normal rate and regular rhythm. Pulmonary:      Effort: Pulmonary effort is normal.      Breath sounds: Normal breath sounds. Abdominal:      General: Bowel sounds are normal. There is no distension. Palpations: Abdomen is soft. Tenderness: There is no abdominal tenderness. There is no guarding or rebound. Musculoskeletal:         General: Tenderness present. Cervical back: Normal range of motion and neck supple.  No rigidity. No muscular tenderness. Right lower leg: Edema present. Left lower leg: No edema. Skin:     General: Skin is warm and dry. Capillary Refill: Capillary refill takes less than 2 seconds. Coloration: Skin is not pale. Findings: No erythema or rash. Neurological:      Mental Status: He is alert and oriented to person, place, and time. Psychiatric:         Mood and Affect: Mood normal.          Procedures     MDM  Number of Diagnoses or Management Options  Acute deep vein thrombosis (DVT) of right peroneal vein (HCC)  Diagnosis management comments: Jenise Ordonez is a 73year old male who presented to ED with concern for swelling and pain to the RLE  Patient was found to have DVT patient started on Eliquis   Patient is not having chest pain or shortness of breath, patient advised to return to ED if symptoms worsen he is agreeable to plan and well appearing at time of discharge                  --------------------------------------------- PAST HISTORY ---------------------------------------------  Past Medical History:  has a past medical history of CAD (coronary artery disease), Cardiomyopathy ([address] Cerebrovascular disease, Hypertension, and TIA (transient ischemic attack). Past Surgical History:  has a past surgical history that includes hernia repair; knee surgery (Bilateral); Coronary angioplasty with stent (11/11/2016); and US BIOPSY SOFT TISSUE NECK/THORAX (11/3/2020). Social History:  reports that he quit smoking about 14 years ago. His smoking use included cigarettes. He started smoking about 51 years ago. He has a 55.50 pack-year smoking history. He has never used smokeless tobacco. He reports previous alcohol use. He reports that he does not use drugs. Family History: family history includes Cancer in his father; Dementia in his mother. The patients home medications have been reviewed.     Allergies: No known allergies    -------------------------------------------------- RESULTS -------------------------------------------------  Labs:  Results for orders placed or performed during the hospital encounter of 07/02/22   CBC with Auto Differential   Result Value Ref Range    WBC 6.6 4.5 - 11.5 E9/L    RBC 5.20 3.80 - 5.80 E12/L    Hemoglobin 15.4 12.5 - 16.5 g/dL    Hematocrit 46.5 37.0 - 54.0 %    MCV 89.4 80.0 - 99.9 fL    MCH 29.6 26.0 - 35.0 pg    MCHC 33.1 32.0 - 34.5 %    RDW 12.8 11.5 - 15.0 fL    Platelets 007 899 - 730 E9/L    MPV 9.5 7.0 - 12.0 fL    Neutrophils % 70.8 43.0 - 80.0 %    Immature Granulocytes % 0.3 0.0 - 5.0 %    Lymphocytes % 14.5 (L) 20.0 - 42.0 %    Monocytes % 11.4 2.0 - 12.0 %    Eosinophils % 2.4 0.0 - 6.0 %    Basophils % 0.6 0.0 - 2.0 %    Neutrophils Absolute 4.70 1.80 - 7.30 E9/L    Immature Granulocytes # 0.02 E9/L    Lymphocytes Absolute 0.96 (L) 1.50 - 4.00 E9/L    Monocytes Absolute 0.76 0.10 - 0.95 E9/L    Eosinophils Absolute 0.16 0.05 - 0.50 E9/L    Basophils Absolute 0.04 0.00 - 0.20 O0/P   Basic Metabolic Panel w/ Reflex to MG   Result Value Ref Range    Sodium 138 132 - 146 mmol/L    Potassium reflex Magnesium 4.2 3.5 - 5.0 mmol/L    Chloride 103 98 - 107 mmol/L    CO2 26 22 - 29 mmol/L    Anion Gap 9 7 - 16 mmol/L    Glucose 113 (H) 74 - 99 mg/dL    BUN 19 6 - 23 mg/dL    CREATININE 0.7 0.7 - 1.2 mg/dL    GFR Non-African American >60 >=60 mL/min/1.73    GFR African American >60     Calcium 9.4 8.6 - 10.2 mg/dL       Radiology:  US DUP LOWER EXTREMITY RIGHT TIFFANY   Final Result   DVT identified in the right peroneal veins. Anika Hunt ------------------------- NURSING NOTES AND VITALS REVIEWED ---------------------------  Date / Time Roomed:  7/2/2022 10:29 AM  ED Bed Assignment:  27/27    The nursing notes within the ED encounter and vital signs as below have been reviewed.    BP (!) 140/72   Pulse 74   Temp 98.6 °F (37 °C) (Oral)   Resp 16   Ht 6' (1.829 m)   Wt 290 lb (131.5 kg)   SpO2 95%   BMI 39.33 kg/m²   Oxygen Saturation Interpretation: Normal      ------------------------------------------ PROGRESS NOTES ------------------------------------------  7:40 PM EDT  I have spoken with the patient and discussed todays results, in addition to providing specific details for the plan of care and counseling regarding the diagnosis and prognosis. Their questions are answered at this time and they are agreeable with the plan. I discussed at length with them reasons for immediate return here for re evaluation. They will followup with their primary care physician by calling their office tomorrow. --------------------------------- ADDITIONAL PROVIDER NOTES ---------------------------------  At this time the patient is without objective evidence of an acute process requiring hospitalization or inpatient management. They have remained hemodynamically stable throughout their entire ED visit and are stable for discharge with outpatient follow-up. The plan has been discussed in detail and they are aware of the specific conditions for emergent return, as well as the importance of follow-up. Discharge Medication List as of 7/2/2022  1:38 PM      START taking these medications    Details   apixaban (ELIQUIS) 5 MG TABS tablet 10mg BID for 7 days, then 5mg BID. Disp QS for 30 days, Refill Zero., Disp-74 tablet, R-0Print             Diagnosis:  1. Acute deep vein thrombosis (DVT) of right peroneal vein (HCC)        Disposition:  Patient's disposition: Discharge to home  Patient's condition is stable.           Yanet Greer MD  07/03/22 [id number]

## 2022-07-03 ASSESSMENT — ENCOUNTER SYMPTOMS
CHEST TIGHTNESS: 0
ABDOMINAL PAIN: 0
DIARRHEA: 0
VOMITING: 0
COUGH: 0
BACK PAIN: 0
PHOTOPHOBIA: 0
SHORTNESS OF BREATH: 0
NAUSEA: 0
ABDOMINAL DISTENTION: 0

## 2022-07-08 ENCOUNTER — TELEPHONE (OUTPATIENT)
Dept: CARDIOLOGY CLINIC | Age: 67
End: 2022-07-08

## 2022-07-08 NOTE — TELEPHONE ENCOUNTER
WENT TO ER FOR SWELLING AND PAIN IN RIGHT LEG. HE WAS TOLD THAT HE HAS A BLOOD CLOT IN THE LEG AND WAS PUT ON ELIQUIS 10 MG BID X1 WEEK THEN 5 MG. BID. PCP WANTS HIM TO SEE A VASCULAR SURGEON. I SPOKE WITH [facility] VASCULAR OFFICE AND THEY NEED A REFERRAL. I TOLD MANNY TO HAVE HIS PCP SEND A REFERRAL AND THEY WILL SCHEDULE AN APPT.  VIRAL

## 2022-08-05 ENCOUNTER — OFFICE VISIT (OUTPATIENT)
Dept: CARDIOLOGY CLINIC | Age: 67
End: 2022-08-05
Payer: MEDICARE

## 2022-08-05 VITALS
BODY MASS INDEX: 40.9 KG/M2 | RESPIRATION RATE: 16 BRPM | OXYGEN SATURATION: 95 % | HEIGHT: 72 IN | HEART RATE: 62 BPM | SYSTOLIC BLOOD PRESSURE: 116 MMHG | WEIGHT: 302 LBS | DIASTOLIC BLOOD PRESSURE: 68 MMHG

## 2022-08-05 DIAGNOSIS — Z79.01 ANTICOAGULATED BY ANTICOAGULATION TREATMENT: ICD-10-CM

## 2022-08-05 DIAGNOSIS — I25.10 CORONARY ARTERY DISEASE INVOLVING NATIVE CORONARY ARTERY OF NATIVE HEART WITHOUT ANGINA PECTORIS: Primary | ICD-10-CM

## 2022-08-05 DIAGNOSIS — Z86.73 H/O: CVA (CEREBROVASCULAR ACCIDENT): ICD-10-CM

## 2022-08-05 DIAGNOSIS — Z98.61 HISTORY OF PTCA: ICD-10-CM

## 2022-08-05 DIAGNOSIS — G47.33 OSA ON CPAP: ICD-10-CM

## 2022-08-05 DIAGNOSIS — Z99.89 OSA ON CPAP: ICD-10-CM

## 2022-08-05 DIAGNOSIS — Z87.442 HISTORY OF KIDNEY STONES: ICD-10-CM

## 2022-08-05 DIAGNOSIS — M17.0 PRIMARY OSTEOARTHRITIS OF BOTH KNEES: ICD-10-CM

## 2022-08-05 DIAGNOSIS — I82.451 ACUTE DEEP VEIN THROMBOSIS (DVT) OF RIGHT PERONEAL VEIN (HCC): ICD-10-CM

## 2022-08-05 DIAGNOSIS — E78.5 DYSLIPIDEMIA: ICD-10-CM

## 2022-08-05 DIAGNOSIS — I25.2 HISTORY OF ACUTE ANTERIOR WALL MI: ICD-10-CM

## 2022-08-05 DIAGNOSIS — E66.01 MORBID OBESITY DUE TO EXCESS CALORIES (HCC): ICD-10-CM

## 2022-08-05 DIAGNOSIS — I10 ESSENTIAL HYPERTENSION: ICD-10-CM

## 2022-08-05 PROCEDURE — 93000 ELECTROCARDIOGRAM COMPLETE: CPT | Performed by: INTERNAL MEDICINE

## 2022-08-05 PROCEDURE — 99214 OFFICE O/P EST MOD 30 MIN: CPT | Performed by: INTERNAL MEDICINE

## 2022-08-05 PROCEDURE — 1036F TOBACCO NON-USER: CPT | Performed by: INTERNAL MEDICINE

## 2022-08-05 PROCEDURE — 1123F ACP DISCUSS/DSCN MKR DOCD: CPT | Performed by: INTERNAL MEDICINE

## 2022-08-05 PROCEDURE — 3017F COLORECTAL CA SCREEN DOC REV: CPT | Performed by: INTERNAL MEDICINE

## 2022-08-05 PROCEDURE — G8417 CALC BMI ABV UP PARAM F/U: HCPCS | Performed by: INTERNAL MEDICINE

## 2022-08-05 PROCEDURE — G8427 DOCREV CUR MEDS BY ELIG CLIN: HCPCS | Performed by: INTERNAL MEDICINE

## 2022-08-05 RX ORDER — NITROGLYCERIN 0.4 MG/1
TABLET SUBLINGUAL
Qty: 25 TABLET | Refills: 3 | Status: SHIPPED | OUTPATIENT
Start: 2022-08-05

## 2022-08-05 RX ORDER — METHIMAZOLE 10 MG/1
10 TABLET ORAL
COMMUNITY

## 2022-08-05 NOTE — PROGRESS NOTES
OFFICE VISIT        PRIMARY CARE PHYSICIAN:    Tony Taylor DO       ALLERGIES / SENSITIVITIES:    Allergies   Allergen Reactions    No Known Allergies           REVIEWED MEDICATIONS:      Current Outpatient Medications:     methIMAzole (TAPAZOLE) 10 MG tablet, Take 10 mg by mouth three times a week, Disp: , Rfl:     nitroGLYCERIN (NITROSTAT) 0.4 MG SL tablet, Dissolve 1 tablet under tongue for chest pain and repeat every 5 min up to max of 3 total doses. If no relief after 3 doses call 911, Disp: 25 tablet, Rfl: 3    apixaban (ELIQUIS) 5 MG TABS tablet, 10mg BID for 7 days, then 5mg BID. Disp QS for 30 days, Refill Zero., Disp: 74 tablet, Rfl: 0    lisinopril (PRINIVIL;ZESTRIL) 5 MG tablet, One tablet daily, Disp: 90 tablet, Rfl: 3    metoprolol succinate (TOPROL XL) 25 MG extended release tablet, Take 1 tablet by mouth daily, Disp: 30 tablet, Rfl: 3    FIBER PO, Take by mouth as needed , Disp: , Rfl:     omeprazole (PRILOSEC) 20 MG delayed release capsule, Take 20 mg by mouth daily, Disp: , Rfl:     atorvastatin (LIPITOR) 80 MG tablet, Take 1 tablet by mouth nightly, Disp: 30 tablet, Rfl: 3    citalopram (CELEXA) 20 MG tablet, Take 20 mg by mouth nightly, Disp: , Rfl:     aspirin 81 MG chewable tablet, Take 81 mg by mouth daily , Disp: , Rfl:     Omega-3 Fatty Acids (FISH OIL) 600 MG CAPS, Take 1,200 mg by mouth 2 times daily , Disp: , Rfl:     Ascorbic Acid (VITAMIN C) 500 MG tablet, Take 500 mg by mouth daily , Disp: , Rfl:       S: REASON FOR VISIT:    Coronary artery disease. Sofi Hernández is a 42-year-old male with a cardiovascular history as described below. He was in the Emergency Room on 7/2/2022 with acute deep vein thrombosis of the right peroneal vein. He was treated with Eliquis 10 mg twice daily for 7 days after which he started taking Eliquis 5 mg twice daily, which he is on. He has an appointment coming up within the coming 2 weeks with [facility], Dr. Luis Eduardo Murdock.   He continues to complain of some numbness feeling in the right shin area. He denies chest pain. He states that he walks 2-3 miles a day as part of his job and denies any worsening exertional dyspnea. He denies orthopnea, PND's, or worsening lower extremity. He denies palpitations, dizziness, presyncope or syncope. He is morbidly obese and unfortunately gained more weight. REVIEW OF SYSTEMS:    CONSTITUTIONAL:  Denies fevers, chills, night sweats or fatigue. HEENT:  Denies headaches. Denies changes in hearing or vision. Denies dysphagia, hoarseness, hemoptysis, hematemesis or epistaxis. ENDOCRINE:  Denies polyphagia, polydipsia or polyuria. Denies heat or cold intolerance. MUSCULOSKELETAL:  He complains of right knee discomfort. SKIN:  Denies any rashes, ulcers or itching. HEME/LYMPH:  Denies any palpable lymph nodes, bleeding or easy bruisability. HEART:  As above. LUNGS:  Denies any sputum production. GI: Denies abdominal pain, nausea, vomiting, diarrhea, constipation, rectal bleeding or tarry stools. :  Denies hematuria or dysuria. PSYCHIATRIC:  Denies mood changes, anxiety or depression (He is on Celexa). NEUROLOGIC:  Denies memory loss, motor weakness, tingling or tremors. CARDIOVASCULAR HISTORY:    1.  Morbid obesity. 2.  Hypertension. 3.  History of CVA x2 approximately 10 years ago. 4.  Remote history of tobacco abuse (10 pack year). 5.  Coronary artery disease:    6. Anterior wall STEMI 11/11/2016.    7.  11/12/2016 cardiac catheterization/angioplasty:  Left main no significant disease. LAD 95% thrombotic subtotal proximal vessel occlusion. LCX long 50% disease of a large 1st high obtuse marginal branch. RCA dominant vessel with no significant disease. Normal left ventricular size with anterolateral hypokinesis with borderline reduced global left ventricular systolic function with an estimated ejection fraction of 50%.   Elevated LVEDP consistent with LV diastolic dysfunction. Successful balloon angioplasty with the deployment of a drug-eluting coronary stent to the proximal LAD with very good results. 8.  Echocardiogram done on 2016 showed a mildly dilated left ventricle with normal left ventricular wall thickness with severe distal anterior, septal and apical hypokinesis to akinesis and distal inferior dyskinesis with an estimated ejection fraction of 40% with stage I left ventricular diastolic dysfunction. Normal right ventricular size and function. Mildly dilated left atrium. Borderline dilated sclerotic and calcified aortic root. 9.  Low HDL. 10.  Treadmill nuclear stress test done on 2022:  Cesar protocol. 6 minutes. 85% of the maximum predicted heart rate. Physiologic BP response. No chest pain. No ischemic EKG changes. Nuclear images were read as showing moderate size inferior defect, partially reversible at the mid to base, probably due to attenuation artifact and/or increased bowel uptake and less likely ischemia with absence of segmental wall motion abnormality with a calculated ejection fraction of 73%. 11.  Acute DVT of the right peroneal vein diagnosed 2022, treated with Eliquis. PAST MEDICAL HISTORY:  1. As under cardiovascular history. 2.  Questionable history of GERD. 3.  History of hernia repair. 4.  History of bilateral knee surgeries in the remote past.  5.  Depression. 6. \"Torn right knee meniscus\" with right knee discomfort, 2020.  7.  Kidney stone: 3 mm obstructing calculus in the distal left ureter just proximal to the UVJ with mild proximal hydronephrosis, 2019.   8.  Severe obstructive sleep apnea diagnosed in 2020, is on CPAP. FAMILY HISTORY:  Noncontributory: Both parents . SOCIAL HISTORY:  Does not smoke. Denies any significant alcohol use. Denies any illicit drug use.          O:  COMPLETE PHYSICAL EXAM:      /68 (Site: Left Upper Arm, Position: Sitting, Cuff Size: Large Adult)   Pulse 62   Resp 16   Ht 6' (1.829 m)   Wt (!) 302 lb (137 kg)   SpO2 95%   BMI 40.96 kg/m²      General:                      Well-developed, morbidly obese male in no acute distress. Head & Neck:              Normocephalic and atraumatic head. No JVD. No carotid bruits. Carotid upstrokes normal bilaterally. No thyromegaly. Sclerae not icteric. No xanthelasmas. Mucous membranes moist.  Chest:                          Symmetrical and nontender. No deformities. Lungs:                         Clear to auscultation bilaterally. Heart:                          Normal S1 and S2. No S3 or S4. No murmurs or rubs. Abdomen:                    Soft, nontender without organomegaly or masses. No bruits. Normal bowel sounds. Extremities:                 +1 edema. Pulses normal.  Skin:                            Normal turgor. No rashes or ulcers noted. Neurologic:                  Oriented x3. No motor or sensory deficits detected. REVIEW OF DIAGNOSTIC TESTS:     CBC and BMP from 7/2/2022 reviewed, in Epic. EKG done today showed sinus rhythm with RSR' in V1: Unchanged. ASSESSMENT / DIAGNOSIS:   1. Coronary artery disease:  Stable with no angina. 2.  Morbid obesity, with continued weight gain. 3.  Hypertension: Adequately controlled. 4.  History of CVA. 5.  History of low HDL:  On statin therapy. 6.  Right knee torn meniscus with right knee discomfort with history of bilateral knee surgeries in the remote past.  7.  History of kidney stones. 8.  History of depression. 9.  Reported severe obstructive sleep apnea on sleep study in 10/2020, compliant with CPAP. 10. DVT of the right peroneal vein, 7/2/2022, on Eliquis. TREATMENT PLAN:   Reassure. Continue current cardiac medications. Refill for sublingual Nitroglycerin given. Patient strongly advised aerobic exercise, following a heart-healthy diabetic diet and losing weight.    To discuss weight loss medications with his endocrinologist. Consider bariatric surgery. Follow up with Vascular Surgery in regards to right lower extremity DVT. Follow up with Cardiology in 1 year or on a prn basis. [facility] [address] [place] [id number] [phone]

## 2022-08-09 ENCOUNTER — TELEPHONE (OUTPATIENT)
Dept: VASCULAR SURGERY | Age: 67
End: 2022-08-09

## 2022-08-10 ENCOUNTER — OFFICE VISIT (OUTPATIENT)
Dept: VASCULAR SURGERY | Age: 67
End: 2022-08-10
Payer: MEDICARE

## 2022-08-10 VITALS
BODY MASS INDEX: 39.28 KG/M2 | SYSTOLIC BLOOD PRESSURE: 122 MMHG | WEIGHT: 290 LBS | HEIGHT: 72 IN | DIASTOLIC BLOOD PRESSURE: 70 MMHG

## 2022-08-10 DIAGNOSIS — I82.451 ACUTE DEEP VEIN THROMBOSIS (DVT) OF RIGHT PERONEAL VEIN (HCC): Primary | ICD-10-CM

## 2022-08-10 PROCEDURE — 1036F TOBACCO NON-USER: CPT | Performed by: PHYSICIAN ASSISTANT

## 2022-08-10 PROCEDURE — 3017F COLORECTAL CA SCREEN DOC REV: CPT | Performed by: PHYSICIAN ASSISTANT

## 2022-08-10 PROCEDURE — 1123F ACP DISCUSS/DSCN MKR DOCD: CPT | Performed by: PHYSICIAN ASSISTANT

## 2022-08-10 PROCEDURE — G8417 CALC BMI ABV UP PARAM F/U: HCPCS | Performed by: PHYSICIAN ASSISTANT

## 2022-08-10 PROCEDURE — G8427 DOCREV CUR MEDS BY ELIG CLIN: HCPCS | Performed by: PHYSICIAN ASSISTANT

## 2022-08-10 PROCEDURE — 99203 OFFICE O/P NEW LOW 30 MIN: CPT | Performed by: PHYSICIAN ASSISTANT

## 2022-08-10 NOTE — PROGRESS NOTES
Vascular Surgery Outpatient Consultation      Chief Complaint   Patient presents with    Consultation     New pt.rt. leg blood clot       Reason for Consult:  RLE peroneal DVT    Requesting Physician:  Dr. Guillaume Rose:                The patient is a 73 y.o. male who is referred for evaluation of an unprovoked right lower extremity peroneal dvt in 7/2022. He had right calf pain and swelling which began 1 week prior to going to the ED where he was diagnosed with the DVT. He denies any trauma, recent surgeries, more sedentary behaviors or other inciting events. This was his first episode of DVT. He has been on eliquis since the diagnosis on 7/2/2022. Since being on the eliquis his pain has subsided. He still has some swelling of the calf at the end of the day. He has never worn compression stockings. Additionally, he has a wound to his right 5th toe which began 2 days ago while at work. 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 He has burning pain localized to the area. Past Medical History:        Diagnosis Date    CAD (coronary artery disease)     Cardiomyopathy ([address]     Cerebrovascular disease     DVT (deep venous thrombosis) ([facility])     Hypertension     TIA (transient ischemic attack)      Past Surgical History:        Procedure Laterality Date    CORONARY ANGIOPLASTY WITH STENT PLACEMENT  11/11/2016    PCI with stent implant to LAD    HERNIA REPAIR      KNEE SURGERY Bilateral     80s    US BIOPSY SOFT TISSUE NECK/THORAX  11/3/2020    US BIOPSY SOFT TISSUE NECK/THORAX 11/3/2020 [facility] ULTRASOUND     Current Medications:   Prior to Admission medications    Medication Sig Start Date End Date Taking?  Authorizing Provider   methIMAzole (TAPAZOLE) 10 MG tablet Take 10 mg by mouth three times a week   Yes Historical Provider, MD   nitroGLYCERIN (NITROSTAT) 0.4 MG SL tablet Dissolve 1 tablet under tongue for chest pain and repeat every 5 min up to max of 3 total doses. If no relief after 3 doses call 911 22  Yes Gloria Wilson MD   apixaban (ELIQUIS) 5 MG TABS tablet 10mg BID for 7 days, then 5mg BID. Disp QS for 30 days, Refill Zero.  22  Yes Dusty Grajeda MD   lisinopril (PRINIVIL;ZESTRIL) 5 MG tablet One tablet daily 1/3/22  Yes Gloria Wilson MD   metoprolol succinate (TOPROL XL) 25 MG extended release tablet Take 1 tablet by mouth daily 1/3/22  Yes Gloria Wilson MD   FIBER PO Take by mouth as needed    Yes Historical Provider, MD   omeprazole (PRILOSEC) 20 MG delayed release capsule Take 20 mg by mouth daily   Yes Historical Provider, MD   atorvastatin (LIPITOR) 80 MG tablet Take 1 tablet by mouth nightly 16  Yes Wendie Xavier DO   citalopram (CELEXA) 20 MG tablet Take 20 mg by mouth nightly   Yes Historical Provider, MD   aspirin 81 MG chewable tablet Take 81 mg by mouth daily    Yes Historical Provider, MD   Omega-3 Fatty Acids (FISH OIL) 600 MG CAPS Take 1,200 mg by mouth 2 times daily    Yes Historical Provider, MD   Ascorbic Acid (VITAMIN C) 500 MG tablet Take 500 mg by mouth daily    Yes Historical Provider, MD     Allergies:  No known allergies    Social History     Socioeconomic History    Marital status:      Spouse name: Not on file    Number of children: Not on file    Years of education: Not on file    Highest education level: Not on file   Occupational History    Not on file   Tobacco Use    Smoking status: Former     Packs/day: 1.50     Years: 37.00     Pack years: 55.50     Types: Cigarettes     Start date:      Quit date:      Years since quittin.6    Smokeless tobacco: Never   Vaping Use    Vaping Use: Never used    Passive vaping exposure: Yes   Substance and Sexual Activity    Alcohol use: Not Currently    Drug use: Not Currently     Types: Marijuana (Krissy Croft), Cocaine     Comment: past use; none since     Sexual activity: Not Currently   Other Topics Concern Not on file   Social History Narrative    Drinks occ cup of coffee. Usually drinks all decaf products.      Social Determinants of Health     Financial Resource Strain: Not on file   Food Insecurity: Not on file   Transportation Needs: Not on file   Physical Activity: Not on file   Stress: Not on file   Social Connections: Not on file   Intimate Partner Violence: Not on file   Housing Stability: Not on file        Family History   Problem Relation Age of Onset    Dementia Mother     Cancer Father        REVIEW OF SYSTEMS (New symptoms):    Eyes:      Blurred vision:  No [x]/Yes []               Diplopia:   No [x]/Yes []               Vision loss:       No [x]/Yes []   Ears, nose, throat:             Hearing loss:    No [x]/Yes []      Vertigo:   No [x]/Yes []                       Swallowing problem:  No [x]/Yes []               Nose bleeds:   No [x]/Yes []      Voice hoarseness:  No [x]/Yes []  Respiratory:             Cough:   No [x]/Yes []      Pleuritic chest pain:  No [x]/Yes []                        Dyspnea:   No [x]/Yes []      Wheezing:   No [x]/Yes []  Cardiovascular:             Angina:   No [x]/Yes []      Palpitations:   No [x]/Yes []          Claudication:    No [x]/Yes []      Leg swelling:   No []/Yes [x]  Gastrointestinal:             Nausea or vomiting:  No [x]/Yes []               Abdominal pain:  No [x]/Yes []                     Intestinal bleeding: No [x]/Yes []  Musculoskeletal:             Leg pain:   No []/Yes [x]      Back pain:   No [x]/Yes []                    Weakness:   No [x]/Yes []  Neurologic:             Numbness:   No [x]/Yes []      Paralysis:   No [x]/Yes []                       Headaches:   No [x]/Yes []  Hematologic, lymphatic:   Anemia:   No [x]/Yes []              Bleeding or bruising:  No [x]/Yes []              Fevers or chills: No [x]/Yes []  Endocrine:             Temp intolerance:   No [x]/Yes []                       Polydipsia, polyuria:  No [x]/Yes []  Skin: Rash:    No [x]/Yes []      Ulcers:   No [x]/Yes []              Abnorm pigment: No [x]/Yes []  :              Frequency/urgency:  No [x]/Yes []      Hematuria:    No [x]/Yes []                      Incontinence:    No [x]/Yes []    PHYSICAL EXAM:  Vitals:    08/10/22 0901   BP: 122/70     General Appearance: alert and oriented to person, place and time, well developed and well- nourished, in no acute distress  Skin: warm and dry, no rash or erythema  Head: normocephalic and atraumatic  Eyes: extraocular eye movements intact, conjunctivae normal  ENT: external ear and ear canal normal bilaterally, nose without deformity  Pulmonary/Chest: clear to auscultation bilaterally- no wheezes, rales or rhonchi, normal air movement, no respiratory distress  Cardiovascular: normal rate, regular rhythm, normal S1 and S2, no murmurs, no carotid bruits  Abdomen: soft, non-tender, non-distended, normal bowel sounds, no masses or organomegaly  Musculoskeletal: normal range of motion, no joint swelling, deformity or tenderness  Neurologic: no cranial nerve deficit, gait, coordination and speech normal  Extremities: mild leg edema bilaterally R>L. Telangiectasias present. Hemosiderin staining present to BLE. Small superficial wound with eschar to the right 5th toe without signs of infection    PULSE EXAM      Right      Left   Brachial     Radial 2 2   Femoral     Popliteal     Dorsalis Pedis biphasic biphasic   Posterior Tibial 2 2   (3=normal, 2=diminished, 1=barely palpable, 4=widened)    Problem List Items Addressed This Visit    None    I reviewed the US with the patient showing peroneal DVT. I discussed the indication for compression stockings and asked that he begin to wear otc knee high compression to aid in edema reduction. We recommend at least 3 months of eliquis therapy. He is having a difficult time affording the medication so samples were given and importance of compliance was discussed.  We will see him back in 3 months and at that time discuss cessation of anticoagulation therapy. Of note, he also has a small superficial ulceration to his right fifth toe from a work injury 2 days ago. In consideration of his DM, I asked him to monitor this wound closely and call with any worsening. He has easily palpable PT pulse with multiphasic DP signal and most likely has sufficient arterial supply to heal the wound. Plan discussed with Dr Vladislav Arias PA-C      No follow-ups on file.

## 2022-10-03 PROBLEM — R73.01 IMPAIRED FASTING GLUCOSE: Status: ACTIVE | Noted: 2022-04-04

## 2022-10-11 ENCOUNTER — TRANSCRIBE ORDERS (OUTPATIENT)
Dept: ADMINISTRATIVE | Age: 67
End: 2022-10-11

## 2022-10-11 DIAGNOSIS — E04.2 MULTINODULAR GOITER: Primary | ICD-10-CM

## 2022-10-29 ENCOUNTER — HOSPITAL ENCOUNTER (OUTPATIENT)
Dept: ULTRASOUND IMAGING | Age: 67
Discharge: HOME | End: 2022-10-31
Payer: MEDICARE

## 2022-10-29 DIAGNOSIS — E04.2 MULTINODULAR GOITER: ICD-10-CM

## 2022-10-29 PROCEDURE — 76536 US EXAM OF HEAD AND NECK: CPT

## 2022-11-09 ENCOUNTER — OFFICE VISIT (OUTPATIENT)
Dept: VASCULAR SURGERY | Age: 67
End: 2022-11-09
Payer: MEDICARE

## 2022-11-09 VITALS — WEIGHT: 280 LBS | HEIGHT: 72 IN | BODY MASS INDEX: 37.93 KG/M2

## 2022-11-09 DIAGNOSIS — I82.451 ACUTE DEEP VEIN THROMBOSIS (DVT) OF RIGHT PERONEAL VEIN (HCC): Primary | ICD-10-CM

## 2022-11-09 PROCEDURE — 1036F TOBACCO NON-USER: CPT | Performed by: SURGERY

## 2022-11-09 PROCEDURE — 1123F ACP DISCUSS/DSCN MKR DOCD: CPT | Performed by: SURGERY

## 2022-11-09 PROCEDURE — G8484 FLU IMMUNIZE NO ADMIN: HCPCS | Performed by: SURGERY

## 2022-11-09 PROCEDURE — 3017F COLORECTAL CA SCREEN DOC REV: CPT | Performed by: SURGERY

## 2022-11-09 PROCEDURE — 99213 OFFICE O/P EST LOW 20 MIN: CPT | Performed by: SURGERY

## 2022-11-09 PROCEDURE — G8417 CALC BMI ABV UP PARAM F/U: HCPCS | Performed by: SURGERY

## 2022-11-09 PROCEDURE — G8427 DOCREV CUR MEDS BY ELIG CLIN: HCPCS | Performed by: SURGERY

## 2022-11-09 NOTE — PROGRESS NOTES
Vascular Surgery Outpatient Consultation      Chief Complaint   Patient presents with    Circulatory Problem     F/u rt. Lower extremity dvt       Reason for Consult:  RLE peroneal DVT    Requesting Physician:  Dr. Ángela Snyder:                The patient is a 78 y.o. male who is referred for evaluation of an unprovoked right lower extremity peroneal dvt in 7/2022. He had right calf pain and swelling which began 1 week prior to going to the ED where he was diagnosed with the DVT. He denies any trauma, recent surgeries, more sedentary behaviors or other inciting events. This was his first episode of DVT. He has been on eliquis since the diagnosis on 7/2/2022. Since being on the eliquis his pain has subsided. He still has some swelling of the calf at the end of the day. He has never worn compression stockings. Additionally, he has a wound to his right 5th toe which began 2 days ago while at work. 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 He has burning pain localized to the area. Above. As of 11/9/2022. He told me that most of his leg swelling is decreased. He really cannot remember if he had any injury around the time of the deep venous thrombosis. This was in the small calf vein. He is been treated for splay 5 months now. He denies any history of known blood clots or bleeding disorders. Denies any active chest pain or shortness of breath. His prior wound to his toe is completely healed he has no current complaints.       Past Medical History:        Diagnosis Date    CAD (coronary artery disease)     Cardiomyopathy ([address]     Cerebrovascular disease     DVT (deep venous thrombosis) ([facility])     Hypertension     TIA (transient ischemic attack)      Past Surgical History:        Procedure Laterality Date    CORONARY ANGIOPLASTY WITH STENT PLACEMENT  11/11/2016    PCI with stent implant to LAD    [facility] SURGERY Bilateral     80s    US BIOPSY SOFT TISSUE NECK/THORAX  11/3/2020    US BIOPSY SOFT TISSUE NECK/THORAX 11/3/2020 [facility] ULTRASOUND     Current Medications:   Prior to Admission medications    Medication Sig Start Date End Date Taking? Authorizing Provider   apixaban (ELIQUIS) 5 MG TABS tablet Take by mouth 2 times daily   Yes Historical Provider, MD   metFORMIN (GLUCOPHAGE-XR) 500 MG extended release tablet Take 500 mg by mouth daily 9/27/22  Yes Historical Provider, MD   methIMAzole (TAPAZOLE) 10 MG tablet Take 10 mg by mouth three times a week   Yes Historical Provider, MD   nitroGLYCERIN (NITROSTAT) 0.4 MG SL tablet Dissolve 1 tablet under tongue for chest pain and repeat every 5 min up to max of 3 total doses. If no relief after 3 doses call 911 8/5/22  Yes Lawyer Boeck, MD   lisinopril (PRINIVIL;ZESTRIL) 5 MG tablet One tablet daily 1/3/22  Yes Lawyer Boeck, MD   metoprolol succinate (TOPROL XL) 25 MG extended release tablet Take 1 tablet by mouth daily 1/3/22  Yes Lawyer Boeck, MD   FIBER PO Take by mouth as needed    Yes Historical Provider, MD   omeprazole (PRILOSEC) 20 MG delayed release capsule Take 20 mg by mouth daily   Yes Historical Provider, MD   atorvastatin (LIPITOR) 80 MG tablet Take 1 tablet by mouth nightly 11/12/16  Yes Beth Evans,    citalopram (CELEXA) 20 MG tablet Take 20 mg by mouth nightly   Yes Historical Provider, MD   aspirin 81 MG chewable tablet Take 81 mg by mouth daily    Yes Historical Provider, MD   Omega-3 Fatty Acids (FISH OIL) 600 MG CAPS Take 1,200 mg by mouth 2 times daily    Yes Historical Provider, MD   Ascorbic Acid (VITAMIN C) 500 MG tablet Take 500 mg by mouth daily    Yes Historical Provider, MD   apixaban (ELIQUIS) 5 MG TABS tablet Take 1 tablet by mouth in the morning and 1 tablet before bedtime.   Patient not taking: Reported on 11/9/2022 8/10/22 10/9/22  Maria Ines Cano PA-C     Allergies:  No known allergies    Social History     Socioeconomic History    Marital status:      Spouse name: Not on file    Number of children: Not on file    Years of education: Not on file    Highest education level: Not on file   Occupational History    Not on file   Tobacco Use    Smoking status: Former     Packs/day: 1.50     Years: 37.00     Pack years: 55.50     Types: Cigarettes     Start date:      Quit date:      Years since quittin.8    Smokeless tobacco: Never    Tobacco comments:     Patient quit smoking 14 yrs.ago. Vaping Use    Vaping Use: Never used    Passive vaping exposure: Yes   Substance and Sexual Activity    Alcohol use: Not Currently    Drug use: Not Currently     Types: Marijuana Cass Cotton), Cocaine     Comment: past use; none since     Sexual activity: Not Currently     Partners: Female   Other Topics Concern    Not on file   Social History Narrative    Drinks occ cup of coffee. Usually drinks all decaf products.      Social Determinants of Health     Financial Resource Strain: Not on file   Food Insecurity: Not on file   Transportation Needs: Not on file   Physical Activity: Not on file   Stress: Not on file   Social Connections: Not on file   Intimate Partner Violence: Not on file   Housing Stability: Not on file        Family History   Problem Relation Age of Onset    Dementia Mother     Cancer Father        REVIEW OF SYSTEMS (New symptoms):    Eyes:      Blurred vision:  No [x]/Yes []               Diplopia:   No [x]/Yes []               Vision loss:       No [x]/Yes []   Ears, nose, throat:             Hearing loss:    No [x]/Yes []      Vertigo:   No [x]/Yes []                       Swallowing problem:  No [x]/Yes []               Nose bleeds:   No [x]/Yes []      Voice hoarseness:  No [x]/Yes []  Respiratory:             Cough:   No [x]/Yes []      Pleuritic chest pain:  No [x]/Yes []                        Dyspnea:   No [x]/Yes []      Wheezing:   No [x]/Yes []  Cardiovascular:             Angina:   No [x]/Yes []      Palpitations:   No [x]/Yes []          Claudication:    No [x]/Yes []      Leg swelling:   No []/Yes [x]  Gastrointestinal:             Nausea or vomiting:  No [x]/Yes []               Abdominal pain:  No [x]/Yes []                     Intestinal bleeding: No [x]/Yes []  Musculoskeletal:             Leg pain:   No []/Yes [x]      Back pain:   No [x]/Yes []                    Weakness:   No [x]/Yes []  Neurologic:             Numbness:   No [x]/Yes []      Paralysis:   No [x]/Yes []                       Headaches:   No [x]/Yes []  Hematologic, lymphatic:   Anemia:   No [x]/Yes []              Bleeding or bruising:  No [x]/Yes []              Fevers or chills: No [x]/Yes []  Endocrine:             Temp intolerance:   No [x]/Yes []                       Polydipsia, polyuria:  No [x]/Yes []  Skin:              Rash:    No [x]/Yes []      Ulcers:   No [x]/Yes []              Abnorm pigment: No [x]/Yes []  :              Frequency/urgency:  No [x]/Yes []      Hematuria:    No [x]/Yes []                      Incontinence:    No [x]/Yes []    PHYSICAL EXAM:  There were no vitals filed for this visit.     General Appearance: alert and oriented to person, place and time, well developed and well- nourished, in no acute distress  Skin: warm and dry, no rash or erythema  Head: normocephalic and atraumatic  Eyes: extraocular eye movements intact, conjunctivae normal  ENT: external ear and ear canal normal bilaterally, nose without deformity  Pulmonary/Chest: clear to auscultation bilaterally- no wheezes, rales or rhonchi, normal air movement, no respiratory distress  Cardiovascular: normal rate, regular rhythm, normal S1 and S2, no murmurs, no carotid bruits  Abdomen: soft, non-tender, non-distended, normal bowel sounds, no masses or organomegaly  Musculoskeletal: normal range of motion, no joint swelling, deformity or tenderness  Neurologic: no cranial nerve deficit, gait, coordination and speech normal  Extremities: mild leg edema bilaterally R>L. Telangiectasias present. Hemosiderin staining present to BLE. Small superficial wound with eschar to the right 5th toe without signs of infection    PULSE EXAM      Right      Left   Brachial     Radial 2 2   Femoral     Popliteal     Dorsalis Pedis biphasic biphasic   Posterior Tibial 2 2   (3=normal, 2=diminished, 1=barely palpable, 4=widened)    Problem List Items Addressed This Visit    None  I reviewed the US with the patient showing peroneal DVT. I discussed the indication for compression stockings and asked that he begin to wear otc knee high compression to aid in edema reduction. Has been on anticoagulation. He otherwise is doing well. From our standpoint I am not convinced that this is unprovoked or not convinced is hard to nail it down. I told him he can finish his prescription anticoagulation. If he were to develop worsening leg pain or discomfort chest pain or shortness of breath he is to go to the emergency room if he is diagnosed with another deep venous thrombosis he would be on anticoagulation for life. As far as his lower extremity fifth digit wound is completely healed. I written a prescription grade compression stocking. He can follow-up with his family care physician. We will send her to hematology for just an additional opinion. Sherren Milling, MD      No follow-ups on file.

## 2022-12-02 ENCOUNTER — HOSPITAL ENCOUNTER (OUTPATIENT)
Age: 67
Discharge: HOME | End: 2022-12-02
Payer: MEDICARE

## 2022-12-02 ENCOUNTER — HOSPITAL ENCOUNTER (OUTPATIENT)
Dept: INFUSION THERAPY | Age: 67
Discharge: HOME | End: 2022-12-02

## 2022-12-02 ENCOUNTER — OFFICE VISIT (OUTPATIENT)
Dept: ONCOLOGY | Age: 67
End: 2022-12-02
Payer: MEDICARE

## 2022-12-02 VITALS
HEIGHT: 72 IN | DIASTOLIC BLOOD PRESSURE: 65 MMHG | BODY MASS INDEX: 40.85 KG/M2 | HEART RATE: 66 BPM | OXYGEN SATURATION: 94 % | WEIGHT: 301.6 LBS | TEMPERATURE: 97.6 F | SYSTOLIC BLOOD PRESSURE: 134 MMHG

## 2022-12-02 DIAGNOSIS — I82.451 ACUTE DEEP VEIN THROMBOSIS (DVT) OF RIGHT PERONEAL VEIN (HCC): ICD-10-CM

## 2022-12-02 DIAGNOSIS — I82.451 ACUTE DEEP VEIN THROMBOSIS (DVT) OF RIGHT PERONEAL VEIN (HCC): Primary | ICD-10-CM

## 2022-12-02 LAB
ALBUMIN SERPL-MCNC: 3.8 G/DL (ref 3.5–5.2)
ALP BLD-CCNC: 129 U/L (ref 40–129)
ALT SERPL-CCNC: 19 U/L (ref 0–40)
ANION GAP SERPL CALCULATED.3IONS-SCNC: 5 MMOL/L (ref 7–16)
APTT: 31.6 SEC (ref 24.5–35.1)
AST SERPL-CCNC: 15 U/L (ref 0–39)
BASOPHILS ABSOLUTE: 0.04 E9/L (ref 0–0.2)
BASOPHILS RELATIVE PERCENT: 0.5 % (ref 0–2)
BILIRUB SERPL-MCNC: 0.8 MG/DL (ref 0–1.2)
BUN BLDV-MCNC: 25 MG/DL (ref 6–23)
CALCIUM SERPL-MCNC: 9.1 MG/DL (ref 8.6–10.2)
CHLORIDE BLD-SCNC: 100 MMOL/L (ref 98–107)
CO2: 30 MMOL/L (ref 22–29)
CREAT SERPL-MCNC: 0.7 MG/DL (ref 0.7–1.2)
D DIMER: 211 NG/ML DDU
EOSINOPHILS ABSOLUTE: 0.18 E9/L (ref 0.05–0.5)
EOSINOPHILS RELATIVE PERCENT: 2.3 % (ref 0–6)
GFR SERPL CREATININE-BSD FRML MDRD: >60 ML/MIN/1.73
GLUCOSE BLD-MCNC: 113 MG/DL (ref 74–99)
HCT VFR BLD CALC: 47.9 % (ref 37–54)
HEMOGLOBIN: 15.6 G/DL (ref 12.5–16.5)
HOMOCYSTEINE: 8.1 UMOL/L (ref 0–15)
IMMATURE GRANULOCYTES #: 0.02 E9/L
IMMATURE GRANULOCYTES %: 0.3 % (ref 0–5)
INR BLD: 1.1
LYMPHOCYTES ABSOLUTE: 1.05 E9/L (ref 1.5–4)
LYMPHOCYTES RELATIVE PERCENT: 13.6 % (ref 20–42)
MCH RBC QN AUTO: 28.8 PG (ref 26–35)
MCHC RBC AUTO-ENTMCNC: 32.6 % (ref 32–34.5)
MCV RBC AUTO: 88.4 FL (ref 80–99.9)
MONOCYTES ABSOLUTE: 0.95 E9/L (ref 0.1–0.95)
MONOCYTES RELATIVE PERCENT: 12.3 % (ref 2–12)
NEUTROPHILS ABSOLUTE: 5.5 E9/L (ref 1.8–7.3)
NEUTROPHILS RELATIVE PERCENT: 71 % (ref 43–80)
PDW BLD-RTO: 13 FL (ref 11.5–15)
PLATELET # BLD: 193 E9/L (ref 130–450)
PMV BLD AUTO: 9.8 FL (ref 7–12)
POTASSIUM SERPL-SCNC: 4.3 MMOL/L (ref 3.5–5)
PROTHROMBIN TIME: 12.6 SEC (ref 9.3–12.4)
RBC # BLD: 5.42 E12/L (ref 3.8–5.8)
SODIUM BLD-SCNC: 135 MMOL/L (ref 132–146)
TOTAL PROTEIN: 6.9 G/DL (ref 6.4–8.3)
WBC # BLD: 7.7 E9/L (ref 4.5–11.5)

## 2022-12-02 PROCEDURE — 85730 THROMBOPLASTIN TIME PARTIAL: CPT

## 2022-12-02 PROCEDURE — 85613 RUSSELL VIPER VENOM DILUTED: CPT

## 2022-12-02 PROCEDURE — 80053 COMPREHEN METABOLIC PANEL: CPT

## 2022-12-02 PROCEDURE — 36415 COLL VENOUS BLD VENIPUNCTURE: CPT

## 2022-12-02 PROCEDURE — 86038 ANTINUCLEAR ANTIBODIES: CPT

## 2022-12-02 PROCEDURE — 81241 F5 GENE: CPT

## 2022-12-02 PROCEDURE — 81400 MOPATH PROCEDURE LEVEL 1: CPT

## 2022-12-02 PROCEDURE — 85025 COMPLETE CBC W/AUTO DIFF WBC: CPT

## 2022-12-02 PROCEDURE — 83090 ASSAY OF HOMOCYSTEINE: CPT

## 2022-12-02 PROCEDURE — 85378 FIBRIN DEGRADE SEMIQUANT: CPT

## 2022-12-02 PROCEDURE — 85303 CLOT INHIBIT PROT C ACTIVITY: CPT

## 2022-12-02 PROCEDURE — 85610 PROTHROMBIN TIME: CPT

## 2022-12-02 PROCEDURE — 85306 CLOT INHIBIT PROT S FREE: CPT

## 2022-12-02 PROCEDURE — 81291 MTHFR GENE: CPT

## 2022-12-02 PROCEDURE — 85300 ANTITHROMBIN III ACTIVITY: CPT

## 2022-12-02 PROCEDURE — 81240 F2 GENE: CPT

## 2022-12-03 LAB
MTHFR BY PCR SPECIMEN: NORMAL
PLASMINOGEN ACT. INHIBITOR-1 (PAI-1) SPECIMEN: NORMAL
PT PCR SPECIMEN: NORMAL
SPECIMEN: NORMAL

## 2022-12-03 NOTE — PROGRESS NOTES
Department of [facility]  Attending Consult Note    Reason for Visit: Consultation on a patient with DVT    Referring Physician:  Dannie Winter DO    PCP:  Kamla Fajardo DO    History of Present Illness:  79 y/o male with hx of CAD, CVA, on Aspirin who was complaining of right calf pain and swelling. RLE U/S 07/02/2022:  DVT identified in the right peroneal veins. 1st episode of DVT. No FHx. No prior trauma, recent surgeries. He was placed on Eliquis and his pain subsided. Review of Systems;  CONSTITUTIONAL: No fever, chills. Fair appetite and energy level. ENMT: Eyes: No diplopia; Nose: No epistaxis. Mouth: No sore throat. RESPIRATORY: No hemoptysis, shortness of breath, cough. CARDIOVASCULAR: No chest pain, palpitations. GASTROINTESTINAL: No nausea/vomiting, abdominal pain, diarrhea/constipation. GENITOURINARY: No dysuria, urinary frequency, hematuria. NEURO: No syncope, presyncope, headache. Remainder:  ROS NEGATIVE    Past Medical History:      Diagnosis Date    CAD (coronary artery disease)     Cardiomyopathy ([address]     Cerebrovascular disease     DVT (deep venous thrombosis) ([facility])     Gastro-esophageal reflux     TIA (transient ischemic attack)      Past Surgical History:      Procedure Laterality Date    CORONARY ANGIOPLASTY WITH STENT PLACEMENT  11/11/2016    PCI with stent implant to LAD    HERNIA REPAIR      KNEE SURGERY Bilateral     80s    US BIOPSY SOFT TISSUE NECK/THORAX  11/3/2020    US BIOPSY SOFT TISSUE NECK/THORAX 11/3/2020 [facility] ULTR[facility]OUND     Family History:  Family History   Problem Relation Age of Onset    Dementia Mother     Cancer Father         pancreatic    No Known Problems Sister     No Known Problems Brother     No Known Problems Brother     Other Daughter         leukemia     Medications:  Reviewed and reconciled.     Social History:  Social History     Socioeconomic History    Marital status:      Spouse name: Not on file    Number of children: Not on file    Years of education: Not on file    Highest education level: Not on file   Occupational History    Not on file   Tobacco Use    Smoking status: Former     Packs/day: 1.50     Years: 37.00     Pack years: 55.50     Types: Cigarettes     Start date:      Quit date:      Years since quittin.9    Smokeless tobacco: Never    Tobacco comments:     Patient quit smoking 14 yrs.ago. Vaping Use    Vaping Use: Never used    Passive vaping exposure: Yes   Substance and Sexual Activity    Alcohol use: Not Currently    Drug use: Not Currently     Types: Marijuana Melissa Palm), Cocaine     Comment: past use; none since     Sexual activity: Not Currently     Partners: Female   Other Topics Concern    Not on file   Social History Narrative    Drinks occ cup of coffee. Usually drinks all decaf products. Social Determinants of Health     Financial Resource Strain: Not on file   Food Insecurity: Not on file   Transportation Needs: Not on file   Physical Activity: Not on file   Stress: Not on file   Social Connections: Not on file   Intimate Partner Violence: Not on file   Housing Stability: Not on file     Allergies: Allergies   Allergen Reactions    No Known Allergies      Physical Exam:  /65   Pulse 66   Temp 97.6 °F (36.4 °C)   Ht 6' (1.829 m)   Wt (!) 301 lb 9.6 oz (136.8 kg)   SpO2 94%   BMI 40.90 kg/m²   GENERAL: Alert, oriented x 3, not in acute distress. HEENT: PERRLA; EOMI. Oropharynx clear. NECK: Supple. Without lymphadenopathy. LUNGS: Good air entry bilaterally. No wheezing, crackles or ronchi. CARDIOVASCULAR: Regular rate. No murmurs, rubs or gallops. ABDOMEN: Soft. Non-tender, non-distended. EXTREMITIES: Without clubbing, cyanosis, or edema. NEUROLOGIC: No focal deficits. Impression/Plan:  79 y/o male with hx of CAD, CVA, on Aspirin who was complaining of right calf pain and swelling. RLE U/S 2022:  DVT identified in the right peroneal veins.   1st episode of DVT. No FHx. No prior trauma, recent surgeries. He was placed on Eliquis and his pain subsided. He stopped Eliquis on 11/09/2022. He continued Aspirin 81 mg po daily. Hypercoag work-up ordered  RLE U/S ordered  RTC 2 weeks to review test results. Thank you for allowing us to participate in the care of   Danyel Christensen MD   12/02/2022

## 2022-12-04 LAB
AT-III ACTIVITY: 83 % ACTIVITY (ref 83–121)
LUPUS ANTICOAG DVVT: ABNORMAL
PROTEIN C ACTIVITY: 84 % ACTIVITY (ref 68–165)

## 2022-12-13 ENCOUNTER — HOSPITAL ENCOUNTER (OUTPATIENT)
Dept: ULTRASOUND IMAGING | Age: 67
Discharge: HOME | End: 2022-12-15
Payer: MEDICARE

## 2022-12-13 DIAGNOSIS — I82.451 ACUTE DEEP VEIN THROMBOSIS (DVT) OF RIGHT PERONEAL VEIN (HCC): ICD-10-CM

## 2022-12-13 PROCEDURE — 93971 EXTREMITY STUDY: CPT

## 2022-12-16 ENCOUNTER — OFFICE VISIT (OUTPATIENT)
Dept: ONCOLOGY | Age: 67
End: 2022-12-16

## 2022-12-16 ENCOUNTER — HOSPITAL ENCOUNTER (OUTPATIENT)
Dept: INFUSION THERAPY | Age: 67
Discharge: HOME | End: 2022-12-16

## 2022-12-16 VITALS
WEIGHT: 301.4 LBS | BODY MASS INDEX: 40.82 KG/M2 | HEART RATE: 68 BPM | OXYGEN SATURATION: 95 % | DIASTOLIC BLOOD PRESSURE: 60 MMHG | TEMPERATURE: 97.1 F | HEIGHT: 72 IN | SYSTOLIC BLOOD PRESSURE: 133 MMHG

## 2022-12-16 DIAGNOSIS — R76.8 POSITIVE ANA (ANTINUCLEAR ANTIBODY): Primary | ICD-10-CM

## 2022-12-16 NOTE — PROGRESS NOTES
Department of [facility]   Attending Clinic Note    Reason for Visit: Follow-up on a patient with DVT    PCP:  Arthur Phalen, DO    History of Present Illness:  77 y/o male with hx of CAD, CVA, on Aspirin who was complaining of right calf pain and swelling. RLE U/S 07/02/2022:  DVT identified in the right peroneal veins. 1st episode of DVT. No FHx. No prior trauma, recent surgeries. He was placed on Eliquis and his pain subsided. He stopped Eliquis on 11/09/2022. He continued Aspirin 81 mg po daily  Today 12/16/2022; No fever, chills. Fair appetite and energy level. Tolerating Aspirin well. No bleeding noted. Review of Systems;  CONSTITUTIONAL: No fever, chills. Fair appetite and energy level. ENMT: Eyes: No diplopia; Nose: No epistaxis. Mouth: No sore throat. RESPIRATORY: No hemoptysis, shortness of breath, cough. CARDIOVASCULAR: No chest pain, palpitations. GASTROINTESTINAL: No nausea/vomiting, abdominal pain  GENITOURINARY: No dysuria, urinary frequency, hematuria. NEURO: No syncope, presyncope, headache. Remainder: ROS NEGATIVE    Past Medical History:      Diagnosis Date    CAD (coronary artery disease)     Cardiomyopathy ([address]     Cerebrovascular disease     DVT (deep venous thrombosis) ([facility])     Gastro-esophageal reflux     TIA (transient ischemic attack)      Medications:  Reviewed and reconciled. Allergies: Allergies   Allergen Reactions    No Known Allergies      Physical Exam:  /60   Pulse 68   Temp 97.1 °F (36.2 °C)   Ht 6' (1.829 m)   Wt (!) 301 lb 6.4 oz (136.7 kg)   SpO2 95%   BMI 40.88 kg/m²   GENERAL: Alert, oriented x 3, not in acute distress. EXTREMITIES: Without clubbing, cyanosis, or edema. Impression/Plan:  77 y/o male with hx of CAD, CVA, on Aspirin who was complaining of right calf pain and swelling. RLE U/S 07/02/2022:  DVT identified in the right peroneal veins. 1st episode of DVT. No FHx. No prior trauma, recent surgeries.    He was

## 2023-01-03 ENCOUNTER — TELEPHONE (OUTPATIENT)
Dept: RHEUMATOLOGY | Age: 68
End: 2023-01-03

## 2023-01-03 ENCOUNTER — TELEPHONE (OUTPATIENT)
Dept: INFUSION THERAPY | Age: 68
End: 2023-01-03

## 2023-01-03 NOTE — TELEPHONE ENCOUNTER
Destiny called for Dr Aleksey Nagy to see if Mian Mohr can be seen sooner that his currently scheduled appt of 06-16-23 for a positive CARTER.   If so please call Destiny with new appt info at [phone]

## 2023-01-03 NOTE — TELEPHONE ENCOUNTER
Patient is referred for positive CARTER and currently scheduled for 6-16. Dr. Josie Gonzalez office is requesting a sooner appt, please advise appropriate time to move patient's visit. Thanks.     Elwin Aase

## 2023-01-03 NOTE — TELEPHONE ENCOUNTER
Pt called in concerned that his new pt visit with Dr Willam Delgado was not until 6/23-This nurse called Dr Orellana's office-office staff states she will send a note to the Dr to see it pt could be soon any sooner-Pt notified of this update-JULIET Murdock, [facility]

## 2023-01-04 NOTE — TELEPHONE ENCOUNTER
Spoke with patient and advised him that we have him on a cancellation list at this time. There is a possibility of him getting a call from our office same day if an opening occurs. Patient voiced understanding.       Electronically signed by Georgina Nagy CMA on 1/4/2023 at 11:36 AM

## 2023-01-06 ENCOUNTER — OFFICE VISIT (OUTPATIENT)
Dept: RHEUMATOLOGY | Age: 68
End: 2023-01-06
Payer: MEDICARE

## 2023-01-06 VITALS
DIASTOLIC BLOOD PRESSURE: 68 MMHG | BODY MASS INDEX: 39.28 KG/M2 | HEART RATE: 74 BPM | WEIGHT: 290 LBS | HEIGHT: 72 IN | SYSTOLIC BLOOD PRESSURE: 130 MMHG | RESPIRATION RATE: 18 BRPM | OXYGEN SATURATION: 95 %

## 2023-01-06 DIAGNOSIS — I82.451 ACUTE DEEP VEIN THROMBOSIS (DVT) OF RIGHT PERONEAL VEIN (HCC): ICD-10-CM

## 2023-01-06 DIAGNOSIS — E05.90 HYPERTHYROIDISM: ICD-10-CM

## 2023-01-06 DIAGNOSIS — R76.8 POSITIVE ANA (ANTINUCLEAR ANTIBODY): ICD-10-CM

## 2023-01-06 DIAGNOSIS — R76.8 POSITIVE ANA (ANTINUCLEAR ANTIBODY): Primary | ICD-10-CM

## 2023-01-06 LAB
C3 COMPLEMENT: 139 MG/DL (ref 90–180)
C4 COMPLEMENT: 43 MG/DL (ref 10–40)

## 2023-01-06 PROCEDURE — 1036F TOBACCO NON-USER: CPT | Performed by: INTERNAL MEDICINE

## 2023-01-06 PROCEDURE — 3075F SYST BP GE 130 - 139MM HG: CPT | Performed by: INTERNAL MEDICINE

## 2023-01-06 PROCEDURE — 3078F DIAST BP <80 MM HG: CPT | Performed by: INTERNAL MEDICINE

## 2023-01-06 PROCEDURE — G8484 FLU IMMUNIZE NO ADMIN: HCPCS | Performed by: INTERNAL MEDICINE

## 2023-01-06 PROCEDURE — G8417 CALC BMI ABV UP PARAM F/U: HCPCS | Performed by: INTERNAL MEDICINE

## 2023-01-06 PROCEDURE — 99204 OFFICE O/P NEW MOD 45 MIN: CPT | Performed by: INTERNAL MEDICINE

## 2023-01-06 PROCEDURE — 3017F COLORECTAL CA SCREEN DOC REV: CPT | Performed by: INTERNAL MEDICINE

## 2023-01-06 PROCEDURE — G8427 DOCREV CUR MEDS BY ELIG CLIN: HCPCS | Performed by: INTERNAL MEDICINE

## 2023-01-06 PROCEDURE — 1123F ACP DISCUSS/DSCN MKR DOCD: CPT | Performed by: INTERNAL MEDICINE

## 2023-01-06 ASSESSMENT — ENCOUNTER SYMPTOMS
BACK PAIN: 1
ABDOMINAL PAIN: 0
SHORTNESS OF BREATH: 0
NAUSEA: 0
COLOR CHANGE: 0
TROUBLE SWALLOWING: 0
VOMITING: 0
DIARRHEA: 0
COUGH: 0

## 2023-01-06 NOTE — PATIENT INSTRUCTIONS
I see no signs of underlying systemic autoimmune disease but will need further workup    Will also need to repeat clotting antibodies in early March

## 2023-01-06 NOTE — PROGRESS NOTES
Lauryn Cornejo 1955 is a 78 y.o. male, here for evaluation of the following chief complaint(s):  New Patient (Patient here as a new patient for positive CARTER. )         ASSESSMENT/PLAN:    Lauryn Cornejo 1955 is a 78 y.o. male seen in consult for positive CARTER. 1.  Positive CARTER-he has a titer of 1: 160 which is a significantly elevated titer. However clinically I see no signs to suggest an underlying systemic connective tissue disease. CBC and CMP were essentially normal.  He did have some positive clotting antibodies which could account for positive CARTER. Also recently diagnosed with hyperthyroidism which could also account for positive CARTER. Also, roughly 10% of the population will have a positive CARTER without an underlying systemic disorder. My suspicion for underlying systemic connective tissue disease is low but will need further work-up as below. 2.  DVT/positive beta-2 glycoprotein-he had positive beta-2 glycoprotein IgG of 88, also weak positive anticardiolipin IgA of 20 and weak positive lupus anticoagulant. We will repeat those in 3 months. With history of DVT which has since resolved with Eliquis, previous MI, previous CVA may need to consider anticoagulation though he does have other traditional cardiovascular risk factors as well. May need to reconvene with oncology if repeat antibodies are positive. 3.  Hyperthyroidism-as above this can also be a cause of positive CARTER. We will check thyroid antibodies. 1. Positive CARTER (antinuclear antibody)  -     C3 Complement; Future  -     C4 Complement; Future  -     Sjogren's Ab (SS-A, SS-B); Future  -     Anti-RNP (ZEYAD Ab); Future  -     Atwood (ZEYAD) Antibody IgG; Future  -     Anti-DNA Antibody, Double-Stranded; Future  -     Urinalysis; Future  -     Beta-2 Glycoprotein Antibodies; Future  -     Cardiolipin Ab IgG, IgM, IgA; Future  -     Dilute Alex Venom Viper Time; Future  -     Thyroid Peroxidase Antibody;  Future  - Thyroid Stimulating Immunoglobulin; Future  -     Thyrotropin receptor antibody; Future  2. Acute deep vein thrombosis (DVT) of right peroneal vein (HCC)  -     C3 Complement; Future  -     C4 Complement; Future  -     Sjogren's Ab (SS-A, SS-B); Future  -     Anti-RNP (ZEYAD Ab); Future  -     Atwood (ZEYAD) Antibody IgG; Future  -     Anti-DNA Antibody, Double-Stranded; Future  -     Urinalysis; Future  -     Beta-2 Glycoprotein Antibodies; Future  -     Cardiolipin Ab IgG, IgM, IgA; Future  -     Dilute Alex Venom Viper Time; Future  3. Hyperthyroidism  -     Thyroid Peroxidase Antibody; Future  -     Thyroid Stimulating Immunoglobulin; Future  -     Thyrotropin receptor antibody; Future      Return in about 3 months (around 4/6/2023). Subjective   SUBJECTIVE/OBJECTIVE:    HPI: Shay Atkins [birth year] is a 78 y.o. male seen in consult for positive CARTER. In July patient had a right lower extremity DVT. He was treated with a few months of Eliquis which has since been stopped. As part of his work-up he had an CARTER done which was positive at 1: 160. Clinically patient feels well. He gets some arthralgias at times but otherwise no issues. Of note he was recently diagnosed with hyperthyroidism and is on methimazole. As part of that work-up however he also had positive beta-2 glycoprotein IgG of 88, weak positive cardiolipin IgA of 20, and weak positive lupus anticoagulant. He also has a history of MI in 2016 and TIA x2 in 2008 though he does also have traditional cardiovascular risk factors for those as well. Denies fever, rash, oral ulcers, nasal ulcers, alopecia, lymphadenopathy, chest pain, shortness of breath, Raynaud's, dysuria, hematuria.     Past Medical History:   Diagnosis Date    CAD (coronary artery disease)     Cardiomy[facility] [address] [address]     Cerebrovascular disease     DVT (deep venous thrombosis) ([facility]CC)     Gastro-esophageal reflux     TIA (transient ischemic attack)         Review of Systems Constitutional:  Negative for fatigue and fever. HENT:  Negative for mouth sores and trouble swallowing. Respiratory:  Negative for cough and shortness of breath. Cardiovascular:  Negative for chest pain. Gastrointestinal:  Negative for abdominal pain, diarrhea, nausea and vomiting. Genitourinary:  Negative for dysuria and hematuria. Musculoskeletal:  Positive for arthralgias and back pain. Negative for joint swelling. Skin:  Negative for color change and rash. Neurological:  Negative for weakness and numbness. Hematological:  Negative for adenopathy. All other systems reviewed and are negative. Objective   Vitals:    01/06/23 0940   BP: 130/68   Pulse: 74   Resp: 18   SpO2: 95%      Physical Exam  Constitutional:       General: He is not in acute distress. Appearance: Normal appearance. HENT:      Head: Normocephalic and atraumatic. Right Ear: External ear normal.      Left Ear: External ear normal.      Nose: Nose normal.   Eyes:      General: No scleral icterus. Pulmonary:      Effort: Pulmonary effort is normal.   Musculoskeletal:         General: No swelling, tenderness or deformity. Skin:     General: Skin is warm and dry. Findings: No rash. Neurological:      General: No focal deficit present. Mental Status: He is alert and oriented to person, place, and time. Mental status is at baseline.    Psychiatric:         Mood and Affect: Mood normal.         Behavior: Behavior normal.          Lab Results   Component Value Date    WBC 7.7 12/02/2022    HGB 15.6 12/02/2022    HCT 47.9 12/02/2022    MCV 88.4 12/02/2022     12/02/2022     Lab Results   Component Value Date     12/02/2022    K 4.3 12/02/2022     12/02/2022    CO2 30 (H) 12/02/2022    BUN 25 (H) 12/02/2022    CREATININE 0.7 12/02/2022    GLUCOSE 113 (H) 12/02/2022    CALCIUM 9.1 12/02/2022    PROT 6.9 12/02/2022    LABALBU 3.8 12/02/2022    BILITOT 0.8 12/02/2022    ALKPHOS 129 12/02/2022    AST 15 12/02/2022    ALT 19 12/02/2022    LABGLOM >60 12/02/2022    GFRAA >60 07/02/2022     Lab Results   Component Value Date    CARTER POSITIVE (A) 12/02/2022     No results found for: RHEUMFACTOR  No results found for: SEDRATE  No results found for: CRP         An electronic signature was used to authenticate this note. This note was generated with a voice recognition dictation system. Please disregard any errors or omission which have escaped my review.     --Masood Rodas, DO

## 2023-01-09 LAB
ANTI DNA DOUBLE STRANDED: NEGATIVE
ENA TO RNP ANTIBODY: NEGATIVE
ENA TO SMITH (SM) ANTIBODY: NEGATIVE
ENA TO SSA (RO) ANTIBODY: NEGATIVE
ENA TO SSB (LA) ANTIBODY: NEGATIVE
THYROID PEROXIDASE (TPO) ABS: 4.3 IU/ML (ref 0–25)
THYROID STIMULATING IMMUNOGLOBULIN: <0.1 IU/L
TSH RECEPTOR AB: <0.8 IU/L

## 2023-03-03 ENCOUNTER — TELEPHONE (OUTPATIENT)
Dept: ENT CLINIC | Age: 68
End: 2023-03-03

## 2023-03-03 ENCOUNTER — OFFICE VISIT (OUTPATIENT)
Dept: ENT CLINIC | Age: 68
End: 2023-03-03
Payer: MEDICARE

## 2023-03-03 VITALS
WEIGHT: 280 LBS | HEIGHT: 72 IN | OXYGEN SATURATION: 91 % | HEART RATE: 62 BPM | DIASTOLIC BLOOD PRESSURE: 64 MMHG | SYSTOLIC BLOOD PRESSURE: 135 MMHG | TEMPERATURE: 98.5 F | BODY MASS INDEX: 37.93 KG/M2

## 2023-03-03 DIAGNOSIS — E04.1 THYROID NODULE: Primary | ICD-10-CM

## 2023-03-03 DIAGNOSIS — E05.90 HYPERTHYROIDISM: ICD-10-CM

## 2023-03-03 PROCEDURE — 1036F TOBACCO NON-USER: CPT | Performed by: OTOLARYNGOLOGY

## 2023-03-03 PROCEDURE — G8484 FLU IMMUNIZE NO ADMIN: HCPCS | Performed by: OTOLARYNGOLOGY

## 2023-03-03 PROCEDURE — 3075F SYST BP GE 130 - 139MM HG: CPT | Performed by: OTOLARYNGOLOGY

## 2023-03-03 PROCEDURE — 1123F ACP DISCUSS/DSCN MKR DOCD: CPT | Performed by: OTOLARYNGOLOGY

## 2023-03-03 PROCEDURE — 3017F COLORECTAL CA SCREEN DOC REV: CPT | Performed by: OTOLARYNGOLOGY

## 2023-03-03 PROCEDURE — G8417 CALC BMI ABV UP PARAM F/U: HCPCS | Performed by: OTOLARYNGOLOGY

## 2023-03-03 PROCEDURE — 99204 OFFICE O/P NEW MOD 45 MIN: CPT | Performed by: OTOLARYNGOLOGY

## 2023-03-03 PROCEDURE — 3078F DIAST BP <80 MM HG: CPT | Performed by: OTOLARYNGOLOGY

## 2023-03-03 PROCEDURE — G8427 DOCREV CUR MEDS BY ELIG CLIN: HCPCS | Performed by: OTOLARYNGOLOGY

## 2023-03-03 ASSESSMENT — ENCOUNTER SYMPTOMS
COLOR CHANGE: 0
CHEST TIGHTNESS: 0
SHORTNESS OF BREATH: 0
RESPIRATORY NEGATIVE: 1
DIARRHEA: 0
ABDOMINAL PAIN: 0
VOMITING: 0
APNEA: 0
TROUBLE SWALLOWING: 0
EYE DISCHARGE: 0
EYES NEGATIVE: 1
EYE PAIN: 0
GASTROINTESTINAL NEGATIVE: 1

## 2023-03-03 NOTE — TELEPHONE ENCOUNTER
[facility] to authorize order with patient insurance. Patient is scheduled for CT Neck with radiology on 4/22/23 @ 10:00am. Patient has been notified of date and time and that they need to arrive at 9:30am. Patient was informed he needs to be NPO 4 hours prior to procedure. Patient instructed to park in [facility] parking lot and report to registration. Patient verbalized understanding. Patient needs pre-contrast labs drawn prior to CT. Order pended for Dr. Kristal Hagen.     Electronically signed by Abraham Colon MA on 3/3/23 at 1:46 PM EST

## 2023-03-03 NOTE — PATIENT INSTRUCTIONS
Thank you for choosing our [facility] or [facility]  [facility] practice. We are committed to your medical treatment and  care. If you need to reschedule or cancel your surgery or follow up  appointment, please call the surgery scheduler at [phone]. INSTRUCTIONS FOR SURGERY total thyroidectomy 5/15/23    Nothing to eat or drink after midnight the night before surgery unless surgery is at [facility] or otherwise instructed by the hospital.    DO NOT TAKE ANY ASPIRIN PRODUCTS 7 days prior to surgery-unless required by your cardiologist or primary care physician. Tylenol only. No Advil, Motrin, Aleve, or Ibuprofen    Any illegal drugs in your system (including Marijuana even if legally prescribed) will result in your surgery being cancelled. Please be sure to check with our office or the hospital on time frame for the drugs to be out of your system. Should your insurance change at any time you must contact our office. Failure to do so may result in your surgery being rescheduled. If you need paperwork filled out for work, you must give the office 2 weeks to complete and submit the forms.       [address], [address] [place], [facility] will call you a couple days prior to your surgery and give you further instructions, if any questions call them at [phone]

## 2023-03-03 NOTE — PROGRESS NOTES
Subjective:     Patient ID:  Yany Barrera is a 78 y.o. male. HPI:    Thyroid  Patient presents for evaluation of hypothyroidism and multiple thyroid nodules. Current symptoms include fatigue, weight gain. Patient denies denies fatigue, weight changes, heat/cold intolerance, bowel/skin changes or CVS symptoms. Thyroid labwork - yes  Findings - hyperthyroidism    History of radiation? no  US? yes  Results -3.0cm right thyroid nodule  FNA? yes  Results - negative      Past Medical History:   Diagnosis Date    CAD (coronary artery disease)     Cardiomyopathy ([address]     Cerebrovascular disease     DVT (deep venous thrombosis) (HCC)     Gastro-esophageal reflux     TIA (transient ischemic attack)      Past Surgical History:   Procedure Laterality Date    CORONARY ANGIOPLASTY WITH STENT PLACEMENT  11/11/2016    PCI with stent implant to LAD    HERNIA REPAIR      KNEE SURGERY Bilateral     80s    US BIOPSY SOFT TISSUE NECK/THORAX  11/3/2020    US BIOPSY SOFT TISSUE NECK/THORAX 11/3/2020 [facility] ULTRASOUND     Family History   Problem Relation Age of Onset    Dementia Mother     Cancer Father         pancreatic    No Known Problems Sister     No Known Problems Brother     No Known Problems Brother     Other Daughter         leukemia     Social History     Socioeconomic History    Marital status:      Spouse name: None    Number of children: None    Years of education: None    Highest education level: None   Tobacco Use    Smoking status: Former     Packs/day: 1.50     Years: 37.00     Pack years: 55.50     Types: Cigarettes     Start date: 27     Quit date: 2008     Years since quitting: 15.1    Smokeless tobacco: Never    Tobacco comments:     Patient quit smoking 14 yrs.ago.    Vaping Use    Vaping Use: Never used    Passive vaping exposure: Yes   Substance and Sexual Activity    Alcohol use: Not Currently    Drug use: Not Currently     Types: Marijuana Donte Miser), Cocaine     Comment: past use; none since 1990 Sexual activity: Not Currently     Partners: Female   Social History Narrative    Drinks occ cup of coffee. Usually drinks all decaf products. Allergies   Allergen Reactions    No Known Allergies            Review of Systems   Constitutional: Negative. Negative for appetite change. HENT:  Negative for trouble swallowing. Eyes: Negative. Negative for pain, discharge and visual disturbance. Respiratory: Negative. Negative for apnea, chest tightness and shortness of breath. Cardiovascular: Negative. Negative for chest pain, palpitations and leg swelling. Gastrointestinal: Negative. Negative for abdominal pain, diarrhea and vomiting. Endocrine: Negative for cold intolerance, heat intolerance and polydipsia. Genitourinary: Negative. Negative for dysuria, flank pain and hematuria. Musculoskeletal: Negative. Negative for arthralgias, gait problem and neck pain. Skin: Negative. Negative for color change, pallor and rash. Allergic/Immunologic: Negative for environmental allergies, food allergies and immunocompromised state. Neurological: Negative. Negative for dizziness, numbness and headaches. Hematological:  Negative for adenopathy. Psychiatric/Behavioral: Negative. Negative for behavioral problems and hallucinations. All other systems reviewed and are negative. Objective:   Physical Exam  Vitals reviewed. Constitutional:       Appearance: He is well-developed. HENT:      Head: Normocephalic and atraumatic. Right Ear: Hearing, tympanic membrane, ear canal and external ear normal.      Left Ear: Hearing, tympanic membrane, ear canal and external ear normal.      Nose: Nose normal.      Mouth/Throat:      Pharynx: Uvula midline. Eyes:      Conjunctiva/sclera: Conjunctivae normal.      Pupils: Pupils are equal, round, and reactive to light. Cardiovascular:      Rate and Rhythm: Normal rate and regular rhythm. Heart sounds: Normal heart sounds. Pulmonary:      Effort: Pulmonary effort is normal.      Breath sounds: Normal breath sounds. Abdominal:      General: Bowel sounds are normal.      Palpations: Abdomen is soft. Musculoskeletal:      Cervical back: Normal range of motion and neck supple. Neurological:      Mental Status: He is alert and oriented to person, place, and time. US  Impression   Slight interval increase in the size of the nodule labeled nodule right 2. This is a TR4 nodule. Ultrasound-guided fine-needle aspiration of this   nodule is recommended. Remaining bilateral thyroid nodules are stable in size. RECOMMENDATIONS   ACR TI-RADS recommendations:       TR5 (>= 7 points):  FNA if >= 1 cm; follow-up if 0.5-0.9 cm in 1, 2, 3, 4,   and 5 years       TR4 (4-6 points):  FNA if >= 1.5 cm; follow-up if 1.0-1.4 cm in 1, 2, 3, and   5 years       TR3 (3 points):  FNA if >= 2.5 cm; follow-up if 1.5-2.4 cm in 1, 3, and 5   years       TR2 (2 points):  No FNA or follow-up       TR1 (0 points):  No FNA or follow-up       ACR TI-RADS recommends that no more than two nodules with the highest ACR   TI-RADS point total should be biopsied and no more than four nodules should   be followed. FNA  Specimen Source:  FNA RT. THYROID     Clinical Data:  Bilateral thyroid nodules; smoker             ON-SITE RAPID STAIN ASSESSMENT:   Evaluation episode 1 (5 passes): Adequate ([facility] 11/3/20)     ADEQUACY STATEMENT:   Specimen is satisfactory for interpretation     DIAGNOSIS   NEGATIVE FOR MALIGNANT CELLS         Assessment:       Diagnosis Orders   1. Thyroid nodule        2. Hyperthyroidism                  Plan:      CT of the neck thyroid evaluation      I recommend:    total Thyroidectomy with nerve integrity monitor.      I will keep the patient overnight for observation after surgery  The procedure risks and benefits were discussed with the patient and family including:    -- Injury to the recurrent laryngeal nerve can occur in 5% of cases. A temporary paralysis of the nerve also may occur if the nerve is stretched during surgery. This may happen if a superior approach is used to remove the gland and the nerve is stretched between where it enters the larynx and below the thyroid next to where it may be bound to Berry's ligament. -- Postoperative bleeding may occur around the trachea. If severe airway obstruction may occur. -- Asymmetry of the skin flaps which may cause a cosmetic deformity. If a total thyroidectomy is performed both recurrent laryngeal nerves are at risk. If both are injured, the patient will have a poor airway and placement of a tracheotomy may be necessary. -- There are four small calcium glands, called parathyroids. The location of these glands is variable and they mimic fat and lymph nodes. If these glands are all removed, calcium metabolism will be disturbed and there will be a rapid (over hours) fall in the serum calcium. If left untreated, this will cause cramps, tetany and cardiac arrest.           Pt and family understood and decided to proceed with the surgery.     Follow up 1 week after surgery

## 2023-03-09 ENCOUNTER — TELEPHONE (OUTPATIENT)
Dept: CARDIOLOGY CLINIC | Age: 68
End: 2023-03-09

## 2023-03-09 NOTE — TELEPHONE ENCOUNTER
Patient needs thyroidectomy per Dr. Dennis Mcmanus scheduled is May. He needs cardiac clearance. Last was seen by you 08/22 and was to return in 1 year (08/23) please advise.

## 2023-04-06 ENCOUNTER — HOSPITAL ENCOUNTER (OUTPATIENT)
Age: 68
Discharge: HOME | End: 2023-04-06
Payer: MEDICARE

## 2023-04-06 DIAGNOSIS — I82.451 ACUTE DEEP VEIN THROMBOSIS (DVT) OF RIGHT PERONEAL VEIN (HCC): ICD-10-CM

## 2023-04-06 DIAGNOSIS — R76.8 POSITIVE ANA (ANTINUCLEAR ANTIBODY): ICD-10-CM

## 2023-04-06 LAB
BUN SERPL-MCNC: 24 MG/DL (ref 6–23)
CREAT SERPL-MCNC: 0.7 MG/DL (ref 0.7–1.2)

## 2023-04-06 PROCEDURE — 82565 ASSAY OF CREATININE: CPT

## 2023-04-06 PROCEDURE — 84520 ASSAY OF UREA NITROGEN: CPT

## 2023-04-06 PROCEDURE — 86147 CARDIOLIPIN ANTIBODY EA IG: CPT

## 2023-04-06 PROCEDURE — 86146 BETA-2 GLYCOPROTEIN ANTIBODY: CPT

## 2023-04-06 PROCEDURE — 36415 COLL VENOUS BLD VENIPUNCTURE: CPT

## 2023-04-06 PROCEDURE — 85613 RUSSELL VIPER VENOM DILUTED: CPT

## 2023-04-07 LAB — LUPUS ANTICOAG DVVT: ABNORMAL

## 2023-04-09 LAB
B2 GLYCOPROT1 IGG SERPL IA-ACNC: 84 SGU
B2 GLYCOPROT1 IGM SERPL IA-ACNC: <10 SMU
CARDIOLIPIN IGA SER IA-ACNC: 18 APL
CARDIOLIPIN IGG SER IA-ACNC: <10 GPL
CARDIOLIPIN IGM SER IA-ACNC: <10 MPL

## 2023-04-12 PROBLEM — D68.61 APS (ANTIPHOSPHOLIPID SYNDROME) (HCC): Status: ACTIVE | Noted: 2023-04-12

## 2023-04-22 ENCOUNTER — HOSPITAL ENCOUNTER (OUTPATIENT)
Dept: CT IMAGING | Age: 68
End: 2023-04-22
Payer: MEDICARE

## 2023-04-22 DIAGNOSIS — E05.90 HYPERTHYROIDISM: ICD-10-CM

## 2023-04-22 DIAGNOSIS — E04.1 THYROID NODULE: ICD-10-CM

## 2023-04-22 PROCEDURE — 70491 CT SOFT TISSUE NECK W/DYE: CPT

## 2023-04-22 PROCEDURE — 6360000004 HC RX CONTRAST MEDICATION: Performed by: RADIOLOGY

## 2023-04-22 RX ADMIN — IOPAMIDOL 75 ML: 755 INJECTION, SOLUTION INTRAVENOUS at 09:48

## 2023-04-28 ENCOUNTER — HOSPITAL ENCOUNTER (OUTPATIENT)
Dept: INFUSION THERAPY | Age: 68
Discharge: HOME | End: 2023-04-28

## 2023-04-28 ENCOUNTER — OFFICE VISIT (OUTPATIENT)
Dept: ONCOLOGY | Age: 68
End: 2023-04-28
Payer: MEDICARE

## 2023-04-28 VITALS
HEIGHT: 72 IN | SYSTOLIC BLOOD PRESSURE: 125 MMHG | HEART RATE: 65 BPM | BODY MASS INDEX: 40.55 KG/M2 | TEMPERATURE: 98 F | DIASTOLIC BLOOD PRESSURE: 71 MMHG | WEIGHT: 299.4 LBS | OXYGEN SATURATION: 92 %

## 2023-04-28 DIAGNOSIS — I82.451 ACUTE DEEP VEIN THROMBOSIS (DVT) OF RIGHT PERONEAL VEIN (HCC): Primary | ICD-10-CM

## 2023-04-28 PROCEDURE — 99214 OFFICE O/P EST MOD 30 MIN: CPT | Performed by: INTERNAL MEDICINE

## 2023-04-28 PROCEDURE — 3078F DIAST BP <80 MM HG: CPT | Performed by: INTERNAL MEDICINE

## 2023-04-28 PROCEDURE — 3017F COLORECTAL CA SCREEN DOC REV: CPT | Performed by: INTERNAL MEDICINE

## 2023-04-28 PROCEDURE — 1036F TOBACCO NON-USER: CPT | Performed by: INTERNAL MEDICINE

## 2023-04-28 PROCEDURE — G8417 CALC BMI ABV UP PARAM F/U: HCPCS | Performed by: INTERNAL MEDICINE

## 2023-04-28 PROCEDURE — G8427 DOCREV CUR MEDS BY ELIG CLIN: HCPCS | Performed by: INTERNAL MEDICINE

## 2023-04-28 PROCEDURE — 1123F ACP DISCUSS/DSCN MKR DOCD: CPT | Performed by: INTERNAL MEDICINE

## 2023-04-28 PROCEDURE — 3074F SYST BP LT 130 MM HG: CPT | Performed by: INTERNAL MEDICINE

## 2023-04-28 NOTE — PROGRESS NOTES
Department of [facility] Med Oncology   Attending Clinic Note    Reason for Visit: Follow-up on a patient with DVT    PCP:  Juan Luis Britton DO    History of Present Illness:  79 y/o male with hx of CAD, CVA, on Aspirin who was complaining of right calf pain and swelling. RLE U/S 07/02/2022:  DVT identified in the right peroneal veins. 1st episode of DVT. No FHx. No prior trauma, recent surgeries. He was placed on Eliquis and his pain subsided. He stopped Eliquis on 11/09/2022. He continued Aspirin 81 mg po daily  Today 04/28/2023; No fever, chills. Fair appetite and energy level. Tolerating Aspirin well. No bleeding noted. Review of Systems;  CONSTITUTIONAL: No fever, chills. Fair appetite and energy level. ENMT: Eyes: No diplopia; Nose: No epistaxis. Mouth: No sore throat. RESPIRATORY: No hemoptysis, shortness of breath, cough. CARDIOVASCULAR: No chest pain, palpitations. GASTROINTESTINAL: No nausea/vomiting, abdominal pain  GENITOURINARY: No dysuria, urinary frequency, hematuria. NEURO: No syncope, presyncope, headache. Remainder: ROS NEGATIVE    Past Medical History:      Diagnosis Date    CAD (coronary artery disease)     Cardiomyopathy ([facility]     Cerebrovascular disease     DVT (deep venous thrombosis) (HCC)     Gastro-esophageal reflux     TIA (transient ischemic attack)      Medications:  Reviewed and reconciled. Allergies: Allergies   Allergen Reactions    No Known Allergies      Physical Exam:  /71   Pulse 65   Temp 98 °F (36.7 °C)   Ht 6' (1.829 m)   Wt 299 lb 6.4 oz (135.8 kg)   SpO2 92%   BMI 40.61 kg/m²   GENERAL: Alert, oriented x 3, not in acute distress. EXTREMITIES: Without clubbing, cyanosis, or edema. Impression/Plan:  79 y/o male with hx of CAD, CVA, on Aspirin who was complaining of right calf pain and swelling. RLE U/S 07/02/2022:  DVT identified in the right peroneal veins. 1st episode of DVT. No FHx. No prior trauma, recent surgeries.    He was placed on

## 2023-05-02 RX ORDER — LISINOPRIL 5 MG/1
TABLET ORAL
Qty: 90 TABLET | Refills: 3 | Status: SHIPPED | OUTPATIENT
Start: 2023-05-02

## 2023-05-24 ENCOUNTER — PREP FOR PROCEDURE (OUTPATIENT)
Dept: ENT CLINIC | Age: 68
End: 2023-05-24

## 2023-05-24 ENCOUNTER — TELEPHONE (OUTPATIENT)
Dept: ENT CLINIC | Age: 68
End: 2023-05-24

## 2023-05-24 PROBLEM — E04.1 THYROID NODULE: Status: ACTIVE | Noted: 2023-05-24

## 2023-05-24 NOTE — TELEPHONE ENCOUNTER
Prior Authorization Form:      DEMOGRAPHICS:                     Patient Name:  Yany Barrera  Patient :  1955            Insurance:  Payor: MEDICARE / Plan: MEDICARE PART A AND B / Product Type: *No Product type* /   Insurance ID Number:    Payer/Plan Subscr  Sex Relation Sub. Ins. ID Effective Group Num   1. [address]* 1955 Male Self [id number]                                    [address] [address]   2.  [address] 1955 Male Self [id number]                                    [address]         DIAGNOSIS & PROCEDURE:                       Procedure/Operation: total thyroidectomy w/ NIM           CPT Code: 44200    Diagnosis:  thyroid nodule    ICD10 Code: E04.1    Location:  [facility]    Surgeon:  ambar    SCHEDULING INFORMATION:                          Date: 23    Time: n/a              Anesthesia:  General                                                       Status:  Outpatient        Special Comments:  include all chart notes       Electronically signed by Richmond Celeste MA on 2023 at 11:44 AM

## 2023-06-05 ENCOUNTER — CLINICAL DOCUMENTATION (OUTPATIENT)
Dept: ONCOLOGY | Age: 68
End: 2023-06-05

## 2023-06-09 ENCOUNTER — OFFICE VISIT (OUTPATIENT)
Dept: ONCOLOGY | Age: 68
End: 2023-06-09
Payer: MEDICARE

## 2023-06-09 ENCOUNTER — HOSPITAL ENCOUNTER (OUTPATIENT)
Dept: INFUSION THERAPY | Age: 68
Discharge: HOME | End: 2023-06-09

## 2023-06-09 VITALS
BODY MASS INDEX: 40.85 KG/M2 | DIASTOLIC BLOOD PRESSURE: 76 MMHG | HEIGHT: 72 IN | OXYGEN SATURATION: 90 % | TEMPERATURE: 97 F | WEIGHT: 301.6 LBS | HEART RATE: 63 BPM | SYSTOLIC BLOOD PRESSURE: 127 MMHG

## 2023-06-09 DIAGNOSIS — I82.451 ACUTE DEEP VEIN THROMBOSIS (DVT) OF RIGHT PERONEAL VEIN (HCC): Primary | ICD-10-CM

## 2023-06-09 DIAGNOSIS — D68.61 ANTIPHOSPHOLIPID SYNDROME (HCC): ICD-10-CM

## 2023-06-09 DIAGNOSIS — R76.8 POSITIVE ANA (ANTINUCLEAR ANTIBODY): ICD-10-CM

## 2023-06-09 PROCEDURE — 3074F SYST BP LT 130 MM HG: CPT | Performed by: INTERNAL MEDICINE

## 2023-06-09 PROCEDURE — 1123F ACP DISCUSS/DSCN MKR DOCD: CPT | Performed by: INTERNAL MEDICINE

## 2023-06-09 PROCEDURE — 3017F COLORECTAL CA SCREEN DOC REV: CPT | Performed by: INTERNAL MEDICINE

## 2023-06-09 PROCEDURE — 1036F TOBACCO NON-USER: CPT | Performed by: INTERNAL MEDICINE

## 2023-06-09 PROCEDURE — G8428 CUR MEDS NOT DOCUMENT: HCPCS | Performed by: INTERNAL MEDICINE

## 2023-06-09 PROCEDURE — G8417 CALC BMI ABV UP PARAM F/U: HCPCS | Performed by: INTERNAL MEDICINE

## 2023-06-09 PROCEDURE — 3078F DIAST BP <80 MM HG: CPT | Performed by: INTERNAL MEDICINE

## 2023-06-09 PROCEDURE — 99214 OFFICE O/P EST MOD 30 MIN: CPT | Performed by: INTERNAL MEDICINE

## 2023-06-09 RX ORDER — ENOXAPARIN SODIUM 300 MG/3ML
INJECTION INTRAVENOUS; SUBCUTANEOUS 2 TIMES DAILY
COMMUNITY

## 2023-06-09 NOTE — PROGRESS NOTES
with APS, especially in those with a history of arterial events. He has a history of premature coronary artery thrombosis at age 63 and unprovoked lower extremity DVT. We discussed the strong recommendation to start on Lovenox and eventually bridge to therapeutic coumadin after his scheduled thyroidectomy on 6/12/23. Therapeutic Lovenox 120 mg bid starting 06/06/2023, will use this up until the evening of 6/11/23--he was instructed to take the evening dose at 6 PM to allow at least 12 hours of no anticoagulation prior to surgery  Restart therapeutic anticoagulation with Lovenox as early as 24 hours after surgery or per surgical recommendations  Continue on Lovenox alone for 2-3 days after surgery, plan to start bridge with coumadin 5 mg nightly on 6/16/23  Will need life-long AC with coumadin or heparin product, will require bridging for procedures 5 days prior and at least 5 days after  Goal INR 2.0-3.0  Monitor INR weekly at local [facility]  RTC 2 months    Adela Woods MD   06/09/2023  I spent a total of 30 minutes on the date of the service which included preparing to see the patient, face-to-face patient care, completing clinical documentation, counseling and educating the family/family members/caregiver, ordering medications, tests, or procedures complicating results with the patient/family/caregiver.

## 2023-06-12 ENCOUNTER — HOSPITAL ENCOUNTER (OUTPATIENT)
Age: 68
Setting detail: OBSERVATION
Discharge: HOME | End: 2023-06-13
Attending: OTOLARYNGOLOGY | Admitting: OTOLARYNGOLOGY
Payer: MEDICARE

## 2023-06-12 DIAGNOSIS — G89.18 POST-OP PAIN: Primary | ICD-10-CM

## 2023-06-12 DIAGNOSIS — E04.1 THYROID NODULE: ICD-10-CM

## 2023-06-12 PROBLEM — Z98.890 S/P TOTAL THYROIDECTOMY: Status: ACTIVE | Noted: 2023-06-12

## 2023-06-12 PROBLEM — E89.0 S/P TOTAL THYROIDECTOMY: Status: ACTIVE | Noted: 2023-06-12

## 2023-06-12 PROBLEM — Z90.89 S/P TOTAL THYROIDECTOMY: Status: ACTIVE | Noted: 2023-06-12

## 2023-06-12 LAB
CALCIUM SERPL-MCNC: 8.9 MG/DL (ref 8.6–10.2)
METER GLUCOSE: 102 MG/DL (ref 74–99)
METER GLUCOSE: 125 MG/DL (ref 74–99)
PTH-INTACT SERPL-MCNC: 17 PG/ML (ref 15–65)

## 2023-06-12 PROCEDURE — 2720000010 HC SURG SUPPLY STERILE: Performed by: OTOLARYNGOLOGY

## 2023-06-12 PROCEDURE — 82310 ASSAY OF CALCIUM: CPT

## 2023-06-12 PROCEDURE — 88307 TISSUE EXAM BY PATHOLOGIST: CPT

## 2023-06-12 PROCEDURE — 36415 COLL VENOUS BLD VENIPUNCTURE: CPT

## 2023-06-12 PROCEDURE — 3700000001 HC ADD 15 MINUTES (ANESTHESIA): Performed by: OTOLARYNGOLOGY

## 2023-06-12 PROCEDURE — 2500000003 HC RX 250 WO HCPCS: Performed by: OTOLARYNGOLOGY

## 2023-06-12 PROCEDURE — 6370000000 HC RX 637 (ALT 250 FOR IP): Performed by: STUDENT IN AN ORGANIZED HEALTH CARE EDUCATION/TRAINING PROGRAM

## 2023-06-12 PROCEDURE — 3600000013 HC SURGERY LEVEL 3 ADDTL 15MIN: Performed by: OTOLARYNGOLOGY

## 2023-06-12 PROCEDURE — 88311 DECALCIFY TISSUE: CPT

## 2023-06-12 PROCEDURE — 2580000003 HC RX 258: Performed by: STUDENT IN AN ORGANIZED HEALTH CARE EDUCATION/TRAINING PROGRAM

## 2023-06-12 PROCEDURE — 7100000000 HC PACU RECOVERY - FIRST 15 MIN: Performed by: OTOLARYNGOLOGY

## 2023-06-12 PROCEDURE — 2709999900 HC NON-CHARGEABLE SUPPLY: Performed by: OTOLARYNGOLOGY

## 2023-06-12 PROCEDURE — 82962 GLUCOSE BLOOD TEST: CPT

## 2023-06-12 PROCEDURE — 6360000002 HC RX W HCPCS: Performed by: STUDENT IN AN ORGANIZED HEALTH CARE EDUCATION/TRAINING PROGRAM

## 2023-06-12 PROCEDURE — 6360000002 HC RX W HCPCS: Performed by: ANESTHESIOLOGY

## 2023-06-12 PROCEDURE — G0378 HOSPITAL OBSERVATION PER HR: HCPCS

## 2023-06-12 PROCEDURE — 3700000000 HC ANESTHESIA ATTENDED CARE: Performed by: OTOLARYNGOLOGY

## 2023-06-12 PROCEDURE — 7100000001 HC PACU RECOVERY - ADDTL 15 MIN: Performed by: OTOLARYNGOLOGY

## 2023-06-12 PROCEDURE — 3600000003 HC SURGERY LEVEL 3 BASE: Performed by: OTOLARYNGOLOGY

## 2023-06-12 PROCEDURE — 83970 ASSAY OF PARATHORMONE: CPT

## 2023-06-12 RX ORDER — SODIUM CHLORIDE 0.9 % (FLUSH) 0.9 %
5-40 SYRINGE (ML) INJECTION PRN
Status: DISCONTINUED | OUTPATIENT
Start: 2023-06-12 | End: 2023-06-12 | Stop reason: HOSPADM

## 2023-06-12 RX ORDER — CITALOPRAM 20 MG/1
20 TABLET ORAL NIGHTLY
Status: DISCONTINUED | OUTPATIENT
Start: 2023-06-12 | End: 2023-06-13 | Stop reason: HOSPADM

## 2023-06-12 RX ORDER — SODIUM CHLORIDE 9 MG/ML
INJECTION, SOLUTION INTRAVENOUS PRN
Status: DISCONTINUED | OUTPATIENT
Start: 2023-06-12 | End: 2023-06-13 | Stop reason: HOSPADM

## 2023-06-12 RX ORDER — ONDANSETRON 4 MG/1
4 TABLET, ORALLY DISINTEGRATING ORAL EVERY 8 HOURS PRN
Status: DISCONTINUED | OUTPATIENT
Start: 2023-06-12 | End: 2023-06-13 | Stop reason: HOSPADM

## 2023-06-12 RX ORDER — LISINOPRIL 5 MG/1
5 TABLET ORAL DAILY
Status: DISCONTINUED | OUTPATIENT
Start: 2023-06-12 | End: 2023-06-13 | Stop reason: HOSPADM

## 2023-06-12 RX ORDER — OMEPRAZOLE 20 MG/1
20 CAPSULE, DELAYED RELEASE ORAL DAILY
Status: DISCONTINUED | OUTPATIENT
Start: 2023-06-12 | End: 2023-06-12 | Stop reason: CLARIF

## 2023-06-12 RX ORDER — MEPERIDINE HYDROCHLORIDE 25 MG/ML
12.5 INJECTION INTRAMUSCULAR; INTRAVENOUS; SUBCUTANEOUS EVERY 10 MIN PRN
Status: DISCONTINUED | OUTPATIENT
Start: 2023-06-12 | End: 2023-06-12 | Stop reason: HOSPADM

## 2023-06-12 RX ORDER — ACETAMINOPHEN 325 MG/1
650 TABLET ORAL EVERY 6 HOURS
Status: DISCONTINUED | OUTPATIENT
Start: 2023-06-12 | End: 2023-06-13 | Stop reason: HOSPADM

## 2023-06-12 RX ORDER — SODIUM CHLORIDE 0.9 % (FLUSH) 0.9 %
5-40 SYRINGE (ML) INJECTION PRN
Status: DISCONTINUED | OUTPATIENT
Start: 2023-06-12 | End: 2023-06-13 | Stop reason: HOSPADM

## 2023-06-12 RX ORDER — SODIUM CHLORIDE 0.9 % (FLUSH) 0.9 %
5-40 SYRINGE (ML) INJECTION EVERY 12 HOURS SCHEDULED
Status: DISCONTINUED | OUTPATIENT
Start: 2023-06-12 | End: 2023-06-13 | Stop reason: HOSPADM

## 2023-06-12 RX ORDER — SODIUM CHLORIDE, SODIUM LACTATE, POTASSIUM CHLORIDE, CALCIUM CHLORIDE 600; 310; 30; 20 MG/100ML; MG/100ML; MG/100ML; MG/100ML
INJECTION, SOLUTION INTRAVENOUS CONTINUOUS
Status: DISCONTINUED | OUTPATIENT
Start: 2023-06-12 | End: 2023-06-12

## 2023-06-12 RX ORDER — FENTANYL CITRATE 50 UG/ML
25 INJECTION, SOLUTION INTRAMUSCULAR; INTRAVENOUS EVERY 5 MIN PRN
Status: DISCONTINUED | OUTPATIENT
Start: 2023-06-12 | End: 2023-06-12 | Stop reason: HOSPADM

## 2023-06-12 RX ORDER — ATORVASTATIN CALCIUM 40 MG/1
80 TABLET, FILM COATED ORAL NIGHTLY
Status: DISCONTINUED | OUTPATIENT
Start: 2023-06-12 | End: 2023-06-13 | Stop reason: HOSPADM

## 2023-06-12 RX ORDER — LIDOCAINE HYDROCHLORIDE AND EPINEPHRINE 10; 10 MG/ML; UG/ML
INJECTION, SOLUTION INFILTRATION; PERINEURAL PRN
Status: DISCONTINUED | OUTPATIENT
Start: 2023-06-12 | End: 2023-06-12 | Stop reason: ALTCHOICE

## 2023-06-12 RX ORDER — SODIUM CHLORIDE 9 MG/ML
INJECTION, SOLUTION INTRAVENOUS CONTINUOUS
Status: DISCONTINUED | OUTPATIENT
Start: 2023-06-12 | End: 2023-06-13 | Stop reason: HOSPADM

## 2023-06-12 RX ORDER — METOPROLOL SUCCINATE 25 MG/1
25 TABLET, EXTENDED RELEASE ORAL DAILY
Status: DISCONTINUED | OUTPATIENT
Start: 2023-06-12 | End: 2023-06-13 | Stop reason: HOSPADM

## 2023-06-12 RX ORDER — DIPHENHYDRAMINE HYDROCHLORIDE 50 MG/ML
12.5 INJECTION INTRAMUSCULAR; INTRAVENOUS
Status: DISCONTINUED | OUTPATIENT
Start: 2023-06-12 | End: 2023-06-12 | Stop reason: HOSPADM

## 2023-06-12 RX ORDER — DEXAMETHASONE SODIUM PHOSPHATE 10 MG/ML
10 INJECTION INTRAMUSCULAR; INTRAVENOUS EVERY 8 HOURS
Status: COMPLETED | OUTPATIENT
Start: 2023-06-12 | End: 2023-06-13

## 2023-06-12 RX ORDER — SODIUM CHLORIDE 0.9 % (FLUSH) 0.9 %
5-40 SYRINGE (ML) INJECTION EVERY 12 HOURS SCHEDULED
Status: DISCONTINUED | OUTPATIENT
Start: 2023-06-12 | End: 2023-06-12 | Stop reason: HOSPADM

## 2023-06-12 RX ORDER — ONDANSETRON 2 MG/ML
4 INJECTION INTRAMUSCULAR; INTRAVENOUS EVERY 6 HOURS PRN
Status: DISCONTINUED | OUTPATIENT
Start: 2023-06-12 | End: 2023-06-13 | Stop reason: HOSPADM

## 2023-06-12 RX ORDER — OXYCODONE HYDROCHLORIDE 5 MG/1
5 TABLET ORAL EVERY 4 HOURS PRN
Status: DISCONTINUED | OUTPATIENT
Start: 2023-06-12 | End: 2023-06-13 | Stop reason: HOSPADM

## 2023-06-12 RX ORDER — SODIUM CHLORIDE 9 MG/ML
INJECTION, SOLUTION INTRAVENOUS PRN
Status: DISCONTINUED | OUTPATIENT
Start: 2023-06-12 | End: 2023-06-12 | Stop reason: HOSPADM

## 2023-06-12 RX ORDER — LEVOTHYROXINE SODIUM 0.1 MG/1
200 TABLET ORAL DAILY
Status: DISCONTINUED | OUTPATIENT
Start: 2023-06-12 | End: 2023-06-13 | Stop reason: HOSPADM

## 2023-06-12 RX ORDER — METFORMIN HYDROCHLORIDE 500 MG/1
500 TABLET, EXTENDED RELEASE ORAL DAILY
Status: DISCONTINUED | OUTPATIENT
Start: 2023-06-12 | End: 2023-06-13 | Stop reason: HOSPADM

## 2023-06-12 RX ORDER — PANTOPRAZOLE SODIUM 40 MG/1
40 TABLET, DELAYED RELEASE ORAL
Status: DISCONTINUED | OUTPATIENT
Start: 2023-06-13 | End: 2023-06-13 | Stop reason: HOSPADM

## 2023-06-12 RX ORDER — PROCHLORPERAZINE EDISYLATE 5 MG/ML
5 INJECTION INTRAMUSCULAR; INTRAVENOUS
Status: DISCONTINUED | OUTPATIENT
Start: 2023-06-12 | End: 2023-06-12 | Stop reason: HOSPADM

## 2023-06-12 RX ADMIN — CITALOPRAM HYDROBROMIDE 20 MG: 20 TABLET ORAL at 20:14

## 2023-06-12 RX ADMIN — OXYCODONE HYDROCHLORIDE 5 MG: 5 TABLET ORAL at 22:24

## 2023-06-12 RX ADMIN — DEXAMETHASONE SODIUM PHOSPHATE 10 MG: 10 INJECTION INTRAMUSCULAR; INTRAVENOUS at 20:13

## 2023-06-12 RX ADMIN — METOPROLOL SUCCINATE 25 MG: 25 TABLET, EXTENDED RELEASE ORAL at 18:11

## 2023-06-12 RX ADMIN — LISINOPRIL 5 MG: 5 TABLET ORAL at 18:11

## 2023-06-12 RX ADMIN — HYDROMORPHONE HYDROCHLORIDE 0.5 MG: 0.5 INJECTION, SOLUTION INTRAMUSCULAR; INTRAVENOUS; SUBCUTANEOUS at 16:48

## 2023-06-12 RX ADMIN — ACETAMINOPHEN 650 MG: 325 TABLET ORAL at 18:11

## 2023-06-12 RX ADMIN — SODIUM CHLORIDE, PRESERVATIVE FREE 10 ML: 5 INJECTION INTRAVENOUS at 20:14

## 2023-06-12 RX ADMIN — ATORVASTATIN CALCIUM 80 MG: 40 TABLET, FILM COATED ORAL at 20:14

## 2023-06-12 RX ADMIN — SODIUM CHLORIDE: 9 INJECTION, SOLUTION INTRAVENOUS at 17:47

## 2023-06-12 RX ADMIN — LEVOTHYROXINE SODIUM 200 MCG: 100 TABLET ORAL at 18:11

## 2023-06-12 RX ADMIN — METFORMIN HYDROCHLORIDE 500 MG: 500 TABLET, EXTENDED RELEASE ORAL at 22:24

## 2023-06-12 ASSESSMENT — PAIN DESCRIPTION - LOCATION
LOCATION: THROAT;NECK
LOCATION: NECK;THROAT
LOCATION: THROAT

## 2023-06-12 ASSESSMENT — PAIN DESCRIPTION - PAIN TYPE
TYPE: SURGICAL PAIN
TYPE: SURGICAL PAIN

## 2023-06-12 ASSESSMENT — PAIN DESCRIPTION - ORIENTATION
ORIENTATION: ANTERIOR
ORIENTATION: ANTERIOR

## 2023-06-12 ASSESSMENT — PAIN DESCRIPTION - ONSET
ONSET: ON-GOING
ONSET: ON-GOING

## 2023-06-12 ASSESSMENT — PAIN DESCRIPTION - DESCRIPTORS
DESCRIPTORS: ACHING;SORE
DESCRIPTORS: SORE
DESCRIPTORS: ACHING;SORE;DISCOMFORT;TENDER

## 2023-06-12 ASSESSMENT — PAIN DESCRIPTION - FREQUENCY
FREQUENCY: CONTINUOUS
FREQUENCY: CONTINUOUS

## 2023-06-12 ASSESSMENT — PAIN - FUNCTIONAL ASSESSMENT
PAIN_FUNCTIONAL_ASSESSMENT: 0-10
PAIN_FUNCTIONAL_ASSESSMENT: ACTIVITIES ARE NOT PREVENTED

## 2023-06-12 ASSESSMENT — PAIN SCALES - GENERAL
PAINLEVEL_OUTOF10: 5
PAINLEVEL_OUTOF10: 8
PAINLEVEL_OUTOF10: 5

## 2023-06-12 NOTE — H&P
Gustabo Mack was seen and re-examined preoperatively today, June 12, 2023. There was no substantial change in his physical and medical status. All Meds and Family/Social/Previous history was reviewed and there were no significant changes. Patient is fit for the proposed surgical procedure. All questions were appropriately addressed and had no further questions regarding the risks, benefits, and alternatives of the procedure. Daniel Barragan and family wished to proceed.     Sonia Lala DO  Resident Physician  [facility])  Otolaryngology Residency  6/12/2023  9:36 AM

## 2023-06-12 NOTE — OP NOTE
Operative Note      Patient: Sukh Dukes  YOB: 1955  MRN: [id number]    Date of Procedure: 6/12/2023    Pre-Op Diagnosis Codes: * Thyroid nodule [E04.1]    Post-Op Diagnosis: Same       Procedure(s):  TOTAL THYROIDECTOMY WITH NERVE INTEGRITY MONITOR    Surgeon(s):  Elsa De Oliveira DO    Assistant:   Resident: Kevin Jerez DO; Praveen Yanez DO    Anesthesia: General    Estimated Blood Loss (mL): less than 50     Complications: None    Specimens:   ID Type Source Tests Collected by Time Destination   A : LEFT THYROID Tissue Tissue SURGICAL PATHOLOGY Elias Lisabruce,  6/12/2023 1244    B : RIGHT THYROID LOBE WITH ISTHMUS Tissue Tissue SURGICAL PATHOLOGY Elias Velazquez, DO 6/12/2023 1453        Implants:  * No implants in log *      Drains: * No LDAs found *    Findings: bilateral thyroid nodules    Detailed Description of Procedure:     HISTORY: Sukh Dukes is a 78 y.o. male with bilateral thyroid nodules. He presents today for total thyroidectomy. The risks and benefits of this procedure were discussed with the patient. The risks including, but not limited to, pain; bleeding; infection; scarring; damage to surrounding structures; recurrence; and the need for further procedures, were explained. The patient acknowledged and understood the risks, and agreed to continue with the procedure. PROCEDURE: The patient was brought in the operating room suite. The patient was placed in the supine position. SCD's were in place, and it was confirmed that she received preoperative antibiotics. After establishment of general anesthesia via orotracheal intubation with a nerve integrity monitoring system endotracheal tube, the eyes were protected with Tegaderm. Nerve integrity monitoring system endotracheal tube was confirmed to be working adequately and secured. The area of planned incision  Was injected with 10cc of 1% lidocaine with epinepherine .  The patient was

## 2023-06-12 NOTE — DISCHARGE INSTRUCTIONS
THYROIDECTOMY DISCHARGE INSTRUCTIONS    Call our office for any questions/concerns and for follow up appointment    Please follow the instructions checked below:    ACTIVITY INSTRUCTIONS:  [x]Increase activity as tolerated    [x]No heavy lifting or strenuous activity     [x]No driving while taking pain medication    WOUND/DRESSING INSTRUCTIONS:  [x]May shower      [x]No sitting in bath tub, hot tub or swimming. [x]Ice to areas of pain for first 24 hours. [x]Your wound was sealed with a coat mastisol and steri strips. All the sutures are underneath the skin. The steri strips will be removed in the office at your follow up appointment. MEDICATION INSTRUCTIONS:  [x]Take medication as prescribed. [x]When taking pain medications, you may experience dizziness or drowsiness. Do not drink alcohol or drive when taking these medications. [x]You may take Ibuprofen (over the counter) as per directions for mild pain. []Do not take any other acetaminophen (Tylenol) products while taking your pain medication. [x]You may experience constipation while taking pain medication - You may take over the counter stool softeners: docuscate (Colace) or sennosides S (Senokot - S).      WORK:  []You may return to work without restrictions   [x]You may not return to work until after follow up appointment with your physcian    Call physician or go to the ER for any of the following or for questions/concerns:   Fever over 101° F    Redness, swelling, hardness or warmth at the wound site (s)  Unrelieved nausea/vomiting    Foul smelling or cloudy drainage at the wound site (s)   Unrelieved pain or increase in pain     Increase in shortness of breath   Symptoms of Hypocalcemia as discussed:   Tingling in your hands, feet, or lips  Muscle spasms or weakness, or facial twitching  Shaking or loss of body control  Seizures  Slow or uneven heartbeat, or lightheadedness  Anxiety, depression, anger, or confusion  Seeing or hearing things

## 2023-06-13 VITALS
SYSTOLIC BLOOD PRESSURE: 110 MMHG | TEMPERATURE: 97.6 F | OXYGEN SATURATION: 91 % | HEART RATE: 66 BPM | HEIGHT: 72 IN | WEIGHT: 290 LBS | BODY MASS INDEX: 39.28 KG/M2 | DIASTOLIC BLOOD PRESSURE: 57 MMHG | RESPIRATION RATE: 18 BRPM

## 2023-06-13 LAB
BASOPHILS # BLD: 0.01 E9/L (ref 0–0.2)
BASOPHILS NFR BLD: 0.1 % (ref 0–2)
BURR CELLS: ABNORMAL
CA-I BLD-SCNC: 1.26 MMOL/L (ref 1.15–1.33)
CALCIUM SERPL-MCNC: 9.1 MG/DL (ref 8.6–10.2)
EOSINOPHIL # BLD: 0 E9/L (ref 0.05–0.5)
EOSINOPHIL NFR BLD: 0 % (ref 0–6)
ERYTHROCYTE [DISTWIDTH] IN BLOOD BY AUTOMATED COUNT: 12.8 FL (ref 11.5–15)
HCT VFR BLD AUTO: 45.4 % (ref 37–54)
HGB BLD-MCNC: 15 G/DL (ref 12.5–16.5)
IMM GRANULOCYTES # BLD: 0.04 E9/L
IMM GRANULOCYTES NFR BLD: 0.4 % (ref 0–5)
LYMPHOCYTES # BLD: 0.37 E9/L (ref 1.5–4)
LYMPHOCYTES NFR BLD: 3.7 % (ref 20–42)
MCH RBC QN AUTO: 29.1 PG (ref 26–35)
MCHC RBC AUTO-ENTMCNC: 33 % (ref 32–34.5)
MCV RBC AUTO: 88 FL (ref 80–99.9)
MONOCYTES # BLD: 0.3 E9/L (ref 0.1–0.95)
MONOCYTES NFR BLD: 3 % (ref 2–12)
NEUTROPHILS # BLD: 9.18 E9/L (ref 1.8–7.3)
NEUTS SEG NFR BLD: 92.8 % (ref 43–80)
PLATELET # BLD AUTO: 175 E9/L (ref 130–450)
PMV BLD AUTO: 9.8 FL (ref 7–12)
POIKILOCYTES: ABNORMAL
PTH-INTACT SERPL-MCNC: 20.6 PG/ML (ref 15–65)
RBC # BLD AUTO: 5.16 E12/L (ref 3.8–5.8)
WBC # BLD: 9.9 E9/L (ref 4.5–11.5)

## 2023-06-13 PROCEDURE — 60240 REMOVAL OF THYROID: CPT | Performed by: OTOLARYNGOLOGY

## 2023-06-13 PROCEDURE — 6370000000 HC RX 637 (ALT 250 FOR IP): Performed by: STUDENT IN AN ORGANIZED HEALTH CARE EDUCATION/TRAINING PROGRAM

## 2023-06-13 PROCEDURE — 82310 ASSAY OF CALCIUM: CPT

## 2023-06-13 PROCEDURE — 36415 COLL VENOUS BLD VENIPUNCTURE: CPT

## 2023-06-13 PROCEDURE — 2700000000 HC OXYGEN THERAPY PER DAY

## 2023-06-13 PROCEDURE — G0378 HOSPITAL OBSERVATION PER HR: HCPCS

## 2023-06-13 PROCEDURE — 82330 ASSAY OF CALCIUM: CPT

## 2023-06-13 PROCEDURE — 6360000002 HC RX W HCPCS: Performed by: STUDENT IN AN ORGANIZED HEALTH CARE EDUCATION/TRAINING PROGRAM

## 2023-06-13 PROCEDURE — 83970 ASSAY OF PARATHORMONE: CPT

## 2023-06-13 PROCEDURE — 85025 COMPLETE CBC W/AUTO DIFF WBC: CPT

## 2023-06-13 RX ORDER — ONDANSETRON 4 MG/1
4 TABLET, FILM COATED ORAL EVERY 6 HOURS
Qty: 20 TABLET | Refills: 0 | Status: ON HOLD | OUTPATIENT
Start: 2023-06-13 | End: 2023-06-21

## 2023-06-13 RX ORDER — HYDROCODONE BITARTRATE AND ACETAMINOPHEN 7.5; 325 MG/1; MG/1
1 TABLET ORAL EVERY 6 HOURS PRN
Qty: 20 TABLET | Refills: 0 | Status: ON HOLD | OUTPATIENT
Start: 2023-06-13 | End: 2023-06-21

## 2023-06-13 RX ORDER — LEVOTHYROXINE SODIUM 0.2 MG/1
200 TABLET ORAL DAILY
Qty: 90 TABLET | Refills: 1 | Status: SHIPPED | OUTPATIENT
Start: 2023-06-13

## 2023-06-13 RX ADMIN — PANTOPRAZOLE SODIUM 40 MG: 40 TABLET, DELAYED RELEASE ORAL at 06:54

## 2023-06-13 RX ADMIN — LEVOTHYROXINE SODIUM 200 MCG: 100 TABLET ORAL at 06:54

## 2023-06-13 RX ADMIN — METOPROLOL SUCCINATE 25 MG: 25 TABLET, EXTENDED RELEASE ORAL at 10:06

## 2023-06-13 RX ADMIN — DEXAMETHASONE SODIUM PHOSPHATE 10 MG: 10 INJECTION INTRAMUSCULAR; INTRAVENOUS at 04:10

## 2023-06-13 RX ADMIN — ACETAMINOPHEN 650 MG: 325 TABLET ORAL at 10:06

## 2023-06-13 RX ADMIN — ACETAMINOPHEN 650 MG: 325 TABLET ORAL at 06:54

## 2023-06-13 RX ADMIN — LISINOPRIL 5 MG: 5 TABLET ORAL at 10:06

## 2023-06-13 RX ADMIN — OXYCODONE HYDROCHLORIDE 5 MG: 5 TABLET ORAL at 04:10

## 2023-06-13 RX ADMIN — DEXAMETHASONE SODIUM PHOSPHATE 10 MG: 10 INJECTION INTRAMUSCULAR; INTRAVENOUS at 12:45

## 2023-06-13 ASSESSMENT — PAIN DESCRIPTION - DESCRIPTORS
DESCRIPTORS: SORE
DESCRIPTORS: SORE

## 2023-06-13 ASSESSMENT — PAIN DESCRIPTION - ORIENTATION: ORIENTATION: ANTERIOR

## 2023-06-13 ASSESSMENT — PAIN - FUNCTIONAL ASSESSMENT: PAIN_FUNCTIONAL_ASSESSMENT: ACTIVITIES ARE NOT PREVENTED

## 2023-06-13 ASSESSMENT — PAIN SCALES - GENERAL
PAINLEVEL_OUTOF10: 3
PAINLEVEL_OUTOF10: 5

## 2023-06-13 ASSESSMENT — PAIN DESCRIPTION - PAIN TYPE: TYPE: SURGICAL PAIN

## 2023-06-13 ASSESSMENT — PAIN DESCRIPTION - FREQUENCY: FREQUENCY: CONTINUOUS

## 2023-06-13 ASSESSMENT — PAIN DESCRIPTION - LOCATION
LOCATION: THROAT
LOCATION: THROAT

## 2023-06-13 ASSESSMENT — PAIN DESCRIPTION - ONSET: ONSET: ON-GOING

## 2023-06-13 NOTE — ACP (ADVANCE CARE PLANNING)
Advance Care Planning   Healthcare Decision Maker:    Primary Decision Maker: Yissel Mack - [facility] - [phone]    Click here to complete Healthcare Decision Makers including selection of the Healthcare Decision Maker Relationship (ie \"Primary\").

## 2023-06-13 NOTE — DISCHARGE SUMMARY
Physician Discharge Summary     Mariano Paul  [id number]    Admit date: 6/12/2023    Discharge date and time: 6/13/23    Admitting Physician: Sheeba Arreguin [address] [place] [address]     Admission Diagnoses:   Patient Active Problem List   Diagnosis    Coronary artery disease involving native coronary artery of native heart without angina pectoris    Ischemic cardiomyopathy    ST elevation myocardial infarction (STEMI) of anterior wall, subsequent episode of care [facility])    S/P PTCA (percutaneous transluminal coronary angioplasty)    Morbid obesity due to excess calories ([address]    Essential hypertension    Dyslipidemia    Low HDL (under 36)    H/O: CVA (cerebrovascular accident)    Acute deep vein thrombosis (DVT) of right peroneal vein (HCC)    Impaired fasting glucose    Positive CARTER (antinuclear antibody)    Hyperthyroidism    APS (antiphospholipid syndrome) (HCC)    Thyroid nodule    S/P total thyroidectomy       Discharge Diagnoses:   Patient Active Problem List   Diagnosis    Coronary artery disease involving native coronary artery of native heart without angina pectoris    Ischemic cardiomyopathy    ST elevation myocardial infarction (STEMI) of anterior wall, subsequent episode of care ([address]    S/P PTCA (percutaneous transluminal coronary angioplasty)    Morbid obesity due to excess calories ([address]    Essential hypertension    Dyslipidemia    Low HDL (under 40)    H/O: CVA (cerebrovascular accident)    Acute deep vein thrombosis (DVT) of right peroneal vein (HCC)    Impaired fasting glucose    Positive CARTER (antinuclear antibody)    Hyperthyroidism    APS (antiphospholipid syndrome) ([address]    Thyroid nodule    S/P total thyroidectomy       Hospital Course: Mariano Paul is a(n) 78 y.o. male who presented due to bilateral thyroid nodules s/p total thyroidectomy. He had an otherwise uneventful course and progressed well. Pain was controlled.  He was tolerating a regular diet with no nausea or vomiting, was ambulating

## 2023-06-13 NOTE — PROGRESS NOTES
OTOLARYNGOLOGY  DAILY PROGRESS NOTE  2023    Subjective:     Patient is doing well today. Tolerating regular diet. Afebrile over night. No issues overnight. Labs within normal limits. Questions answered. Objective:     BP (!) 106/58   Pulse 59   Temp 97.5 °F (36.4 °C) (Oral)   Resp 18   Ht 6' (1.829 m)   Wt 290 lb (131.5 kg)   SpO2 94%   BMI 39.33 kg/m²   PULSE OXIMETRY RANGE: SpO2  Av.9 %  Min: 91 %  Max: 96 %  I/O last 3 completed shifts: In: 1100 [I.V.:1100]  Out: 1350 [Urine:1300; Blood:50]    GENERAL:  Laying in bed, awake, alert, cooperative, no apparent distress  HENT: Normocephalic, no nasal drainage, mucous membranes are pink and mouist   NEURO: CN II-XII intact, no hoarseness or change in voice   NECK: Incision is clean, dry, and intact  EYES: No sclera icterus, pupils equal  LUNGS:  No increased work of breathing, no respiratory distress or stridor   CARDIOVASCULAR: Normal rate     CBC  Recent Labs     23  0427   WBC 9.9   HGB 15.0   HCT 45.4        BMP  Recent Labs     23  0427   CALCIUM 9.1     PT-INR  No results for input(s): INR, PTT in the last 72 hours.     Invalid input(s): PT  CALCIUM  Recent Labs     23  1600 23  0427   CALCIUM 8.9 9.1       Assessment/Plan:     78 y.o. male POD #1 s/p total thyroidectomy for bilateral nodules    Total thyroidectomy - pathology pending, further recs to follow in clinic  Labs within normal limits  General diet as tolerated  Pain and nausea control PRN  Ambulate and out of bed   SCDs   Start Synthroid    DC to home today    Will discuss with attending    Electronically signed by Theresa Feliciano DO on 2023 at 7:05 AM

## 2023-06-13 NOTE — CARE COORDINATION
Met with patient and spouse about diagnosis and discharge plan of care. POD#1 total thyroidectomy. O2 weaned. Pt lives with spouse in ranch home. Independent prior to admit. Has c-pap, no other dme. Plan is home today with no needs.  PCP is Dr Tess Bertrand

## 2023-06-13 NOTE — PLAN OF CARE
Problem: Discharge Planning  Goal: Discharge to home or other facility with appropriate resources  6/13/2023 0007 by Marija Richards RN  Outcome: Progressing  6/12/2023 1838 by French Rodrigez RN  Outcome: Progressing     Problem: Pain  Goal: Verbalizes/displays adequate comfort level or baseline comfort level  6/13/2023 0007 by Marija Richards RN  Outcome: Progressing  6/12/2023 1838 by French Rodrigez RN  Outcome: Progressing     Problem: Safety - Adult  Goal: Free from fall injury  6/13/2023 0007 by Marija Richards RN  Outcome: Progressing  6/12/2023 1838 by French Rodrigez RN  Outcome: Progressing     Problem: ABCDS Injury Assessment  Goal: Absence of physical injury  6/12/2023 1838 by French Rodrigez RN  Outcome: Progressing

## 2023-06-17 ENCOUNTER — APPOINTMENT (OUTPATIENT)
Dept: CT IMAGING | Age: 68
DRG: 920 | End: 2023-06-17
Payer: MEDICARE

## 2023-06-17 ENCOUNTER — APPOINTMENT (OUTPATIENT)
Dept: GENERAL RADIOLOGY | Age: 68
DRG: 920 | End: 2023-06-17
Payer: MEDICARE

## 2023-06-17 ENCOUNTER — HOSPITAL ENCOUNTER (INPATIENT)
Age: 68
LOS: 3 days | Discharge: HOME | DRG: 920 | End: 2023-06-21
Attending: EMERGENCY MEDICINE | Admitting: OTOLARYNGOLOGY
Payer: MEDICARE

## 2023-06-17 DIAGNOSIS — G89.18 POST-OP PAIN: ICD-10-CM

## 2023-06-17 DIAGNOSIS — S10.93XA HEMATOMA OF NECK, INITIAL ENCOUNTER: Primary | ICD-10-CM

## 2023-06-17 PROBLEM — H59.333: Status: ACTIVE | Noted: 2023-06-17

## 2023-06-17 PROBLEM — S10.93XD: Status: ACTIVE | Noted: 2023-06-17

## 2023-06-17 LAB
ABO + RH BLD: NORMAL
ALBUMIN SERPL-MCNC: 3.4 G/DL (ref 3.5–5.2)
ALP SERPL-CCNC: 97 U/L (ref 40–129)
ALT SERPL-CCNC: 70 U/L (ref 0–40)
ANION GAP SERPL CALCULATED.3IONS-SCNC: 5 MMOL/L (ref 7–16)
APTT BLD: 34.6 SEC (ref 24.5–35.1)
AST SERPL-CCNC: 17 U/L (ref 0–39)
BASOPHILS # BLD: 0.01 E9/L (ref 0–0.2)
BASOPHILS NFR BLD: 0.1 % (ref 0–2)
BILIRUB SERPL-MCNC: 0.8 MG/DL (ref 0–1.2)
BLD GP AB SCN SERPL QL: NORMAL
BNP BLD-MCNC: 73 PG/ML (ref 0–125)
BUN SERPL-MCNC: 21 MG/DL (ref 6–23)
CALCIUM SERPL-MCNC: 9.5 MG/DL (ref 8.6–10.2)
CHLORIDE SERPL-SCNC: 105 MMOL/L (ref 98–107)
CO2 SERPL-SCNC: 33 MMOL/L (ref 22–29)
CREAT SERPL-MCNC: 0.8 MG/DL (ref 0.7–1.2)
EOSINOPHIL # BLD: 0.15 E9/L (ref 0.05–0.5)
EOSINOPHIL NFR BLD: 1.6 % (ref 0–6)
ERYTHROCYTE [DISTWIDTH] IN BLOOD BY AUTOMATED COUNT: 13.4 FL (ref 11.5–15)
GLUCOSE SERPL-MCNC: 99 MG/DL (ref 74–99)
HCT VFR BLD AUTO: 44.7 % (ref 37–54)
HGB BLD-MCNC: 14.3 G/DL (ref 12.5–16.5)
IMM GRANULOCYTES # BLD: 0.06 E9/L
IMM GRANULOCYTES NFR BLD: 0.6 % (ref 0–5)
INR BLD: 1.2
LACTATE BLDV-SCNC: 1 MMOL/L (ref 0.5–2.2)
LYMPHOCYTES # BLD: 1.22 E9/L (ref 1.5–4)
LYMPHOCYTES NFR BLD: 13.2 % (ref 20–42)
MCH RBC QN AUTO: 29.1 PG (ref 26–35)
MCHC RBC AUTO-ENTMCNC: 32 % (ref 32–34.5)
MCV RBC AUTO: 91 FL (ref 80–99.9)
METER GLUCOSE: 122 MG/DL (ref 74–99)
METER GLUCOSE: 155 MG/DL (ref 74–99)
MONOCYTES # BLD: 0.99 E9/L (ref 0.1–0.95)
MONOCYTES NFR BLD: 10.7 % (ref 2–12)
NEUTROPHILS # BLD: 6.84 E9/L (ref 1.8–7.3)
NEUTS SEG NFR BLD: 73.8 % (ref 43–80)
PLATELET # BLD AUTO: 186 E9/L (ref 130–450)
PMV BLD AUTO: 9.7 FL (ref 7–12)
POTASSIUM SERPL-SCNC: 4.3 MMOL/L (ref 3.5–5)
PROT SERPL-MCNC: 6.3 G/DL (ref 6.4–8.3)
PROTHROMBIN TIME: 14 SEC (ref 9.3–12.4)
RBC # BLD AUTO: 4.91 E12/L (ref 3.8–5.8)
SODIUM SERPL-SCNC: 143 MMOL/L (ref 132–146)
TROPONIN, HIGH SENSITIVITY: 9 NG/L (ref 0–11)
WBC # BLD: 9.3 E9/L (ref 4.5–11.5)

## 2023-06-17 PROCEDURE — 99285 EMERGENCY DEPT VISIT HI MDM: CPT

## 2023-06-17 PROCEDURE — G0378 HOSPITAL OBSERVATION PER HR: HCPCS

## 2023-06-17 PROCEDURE — 83605 ASSAY OF LACTIC ACID: CPT

## 2023-06-17 PROCEDURE — 3600000013 HC SURGERY LEVEL 3 ADDTL 15MIN: Performed by: OTOLARYNGOLOGY

## 2023-06-17 PROCEDURE — 85610 PROTHROMBIN TIME: CPT

## 2023-06-17 PROCEDURE — 86901 BLOOD TYPING SEROLOGIC RH(D): CPT

## 2023-06-17 PROCEDURE — 87081 CULTURE SCREEN ONLY: CPT

## 2023-06-17 PROCEDURE — 83516 IMMUNOASSAY NONANTIBODY: CPT

## 2023-06-17 PROCEDURE — 6370000000 HC RX 637 (ALT 250 FOR IP): Performed by: INTERNAL MEDICINE

## 2023-06-17 PROCEDURE — 0JC50ZZ EXTIRPATION OF MATTER FROM LEFT NECK SUBCUTANEOUS TISSUE AND FASCIA, OPEN APPROACH: ICD-10-PCS | Performed by: OTOLARYNGOLOGY

## 2023-06-17 PROCEDURE — 7100000000 HC PACU RECOVERY - FIRST 15 MIN: Performed by: OTOLARYNGOLOGY

## 2023-06-17 PROCEDURE — 2500000003 HC RX 250 WO HCPCS: Performed by: OTOLARYNGOLOGY

## 2023-06-17 PROCEDURE — 86900 BLOOD TYPING SEROLOGIC ABO: CPT

## 2023-06-17 PROCEDURE — 80053 COMPREHEN METABOLIC PANEL: CPT

## 2023-06-17 PROCEDURE — 6360000002 HC RX W HCPCS

## 2023-06-17 PROCEDURE — 84484 ASSAY OF TROPONIN QUANT: CPT

## 2023-06-17 PROCEDURE — 3600000003 HC SURGERY LEVEL 3 BASE: Performed by: OTOLARYNGOLOGY

## 2023-06-17 PROCEDURE — 2709999900 HC NON-CHARGEABLE SUPPLY: Performed by: OTOLARYNGOLOGY

## 2023-06-17 PROCEDURE — 2720000010 HC SURG SUPPLY STERILE: Performed by: OTOLARYNGOLOGY

## 2023-06-17 PROCEDURE — 70491 CT SOFT TISSUE NECK W/DYE: CPT

## 2023-06-17 PROCEDURE — 3700000001 HC ADD 15 MINUTES (ANESTHESIA): Performed by: OTOLARYNGOLOGY

## 2023-06-17 PROCEDURE — 85730 THROMBOPLASTIN TIME PARTIAL: CPT

## 2023-06-17 PROCEDURE — 96375 TX/PRO/DX INJ NEW DRUG ADDON: CPT

## 2023-06-17 PROCEDURE — 6370000000 HC RX 637 (ALT 250 FOR IP)

## 2023-06-17 PROCEDURE — 2580000003 HC RX 258

## 2023-06-17 PROCEDURE — 96374 THER/PROPH/DIAG INJ IV PUSH: CPT

## 2023-06-17 PROCEDURE — 83880 ASSAY OF NATRIURETIC PEPTIDE: CPT

## 2023-06-17 PROCEDURE — 3700000000 HC ANESTHESIA ATTENDED CARE: Performed by: OTOLARYNGOLOGY

## 2023-06-17 PROCEDURE — 99223 1ST HOSP IP/OBS HIGH 75: CPT | Performed by: STUDENT IN AN ORGANIZED HEALTH CARE EDUCATION/TRAINING PROGRAM

## 2023-06-17 PROCEDURE — 6360000004 HC RX CONTRAST MEDICATION: Performed by: RADIOLOGY

## 2023-06-17 PROCEDURE — 6360000002 HC RX W HCPCS: Performed by: EMERGENCY MEDICINE

## 2023-06-17 PROCEDURE — 82962 GLUCOSE BLOOD TEST: CPT

## 2023-06-17 PROCEDURE — 85025 COMPLETE CBC W/AUTO DIFF WBC: CPT

## 2023-06-17 PROCEDURE — 94660 CPAP INITIATION&MGMT: CPT

## 2023-06-17 PROCEDURE — 71045 X-RAY EXAM CHEST 1 VIEW: CPT

## 2023-06-17 PROCEDURE — 7100000001 HC PACU RECOVERY - ADDTL 15 MIN: Performed by: OTOLARYNGOLOGY

## 2023-06-17 PROCEDURE — 86850 RBC ANTIBODY SCREEN: CPT

## 2023-06-17 PROCEDURE — 0J9500Z DRAINAGE OF LEFT NECK SUBCUTANEOUS TISSUE AND FASCIA WITH DRAINAGE DEVICE, OPEN APPROACH: ICD-10-PCS | Performed by: OTOLARYNGOLOGY

## 2023-06-17 PROCEDURE — 36415 COLL VENOUS BLD VENIPUNCTURE: CPT

## 2023-06-17 PROCEDURE — 0W9630Z DRAINAGE OF NECK WITH DRAINAGE DEVICE, PERCUTANEOUS APPROACH: ICD-10-PCS | Performed by: OTOLARYNGOLOGY

## 2023-06-17 PROCEDURE — 93005 ELECTROCARDIOGRAM TRACING: CPT | Performed by: EMERGENCY MEDICINE

## 2023-06-17 PROCEDURE — 86225 DNA ANTIBODY NATIVE: CPT

## 2023-06-17 RX ORDER — SODIUM CHLORIDE 9 MG/ML
INJECTION, SOLUTION INTRAVENOUS PRN
Status: DISCONTINUED | OUTPATIENT
Start: 2023-06-17 | End: 2023-06-17 | Stop reason: HOSPADM

## 2023-06-17 RX ORDER — SODIUM CHLORIDE 0.9 % (FLUSH) 0.9 %
5-40 SYRINGE (ML) INJECTION PRN
Status: DISCONTINUED | OUTPATIENT
Start: 2023-06-17 | End: 2023-06-21 | Stop reason: HOSPADM

## 2023-06-17 RX ORDER — INSULIN LISPRO 100 [IU]/ML
0-4 INJECTION, SOLUTION INTRAVENOUS; SUBCUTANEOUS NIGHTLY
Status: DISCONTINUED | OUTPATIENT
Start: 2023-06-17 | End: 2023-06-21 | Stop reason: HOSPADM

## 2023-06-17 RX ORDER — ONDANSETRON 4 MG/1
4 TABLET, ORALLY DISINTEGRATING ORAL EVERY 8 HOURS PRN
Status: DISCONTINUED | OUTPATIENT
Start: 2023-06-17 | End: 2023-06-21 | Stop reason: HOSPADM

## 2023-06-17 RX ORDER — SODIUM CHLORIDE 0.9 % (FLUSH) 0.9 %
5-40 SYRINGE (ML) INJECTION EVERY 12 HOURS SCHEDULED
Status: DISCONTINUED | OUTPATIENT
Start: 2023-06-17 | End: 2023-06-21 | Stop reason: HOSPADM

## 2023-06-17 RX ORDER — ATORVASTATIN CALCIUM 40 MG/1
80 TABLET, FILM COATED ORAL NIGHTLY
Status: DISCONTINUED | OUTPATIENT
Start: 2023-06-17 | End: 2023-06-21 | Stop reason: HOSPADM

## 2023-06-17 RX ORDER — SODIUM CHLORIDE, SODIUM LACTATE, POTASSIUM CHLORIDE, CALCIUM CHLORIDE 600; 310; 30; 20 MG/100ML; MG/100ML; MG/100ML; MG/100ML
INJECTION, SOLUTION INTRAVENOUS CONTINUOUS
Status: DISCONTINUED | OUTPATIENT
Start: 2023-06-17 | End: 2023-06-17

## 2023-06-17 RX ORDER — IPRATROPIUM BROMIDE AND ALBUTEROL SULFATE 2.5; .5 MG/3ML; MG/3ML
1 SOLUTION RESPIRATORY (INHALATION)
Status: DISCONTINUED | OUTPATIENT
Start: 2023-06-17 | End: 2023-06-17 | Stop reason: HOSPADM

## 2023-06-17 RX ORDER — CITALOPRAM 20 MG/1
20 TABLET ORAL NIGHTLY
Status: DISCONTINUED | OUTPATIENT
Start: 2023-06-17 | End: 2023-06-21 | Stop reason: HOSPADM

## 2023-06-17 RX ORDER — OXYCODONE HYDROCHLORIDE 5 MG/1
5 TABLET ORAL EVERY 4 HOURS PRN
Status: DISCONTINUED | OUTPATIENT
Start: 2023-06-17 | End: 2023-06-21 | Stop reason: HOSPADM

## 2023-06-17 RX ORDER — LIDOCAINE HYDROCHLORIDE AND EPINEPHRINE 10; 10 MG/ML; UG/ML
INJECTION, SOLUTION INFILTRATION; PERINEURAL PRN
Status: DISCONTINUED | OUTPATIENT
Start: 2023-06-17 | End: 2023-06-17 | Stop reason: ALTCHOICE

## 2023-06-17 RX ORDER — LABETALOL HYDROCHLORIDE 5 MG/ML
10 INJECTION, SOLUTION INTRAVENOUS
Status: DISCONTINUED | OUTPATIENT
Start: 2023-06-17 | End: 2023-06-17 | Stop reason: HOSPADM

## 2023-06-17 RX ORDER — OXYCODONE HYDROCHLORIDE 5 MG/1
10 TABLET ORAL EVERY 4 HOURS PRN
Status: DISCONTINUED | OUTPATIENT
Start: 2023-06-17 | End: 2023-06-21 | Stop reason: HOSPADM

## 2023-06-17 RX ORDER — LEVOTHYROXINE SODIUM 0.1 MG/1
200 TABLET ORAL DAILY
Status: DISCONTINUED | OUTPATIENT
Start: 2023-06-17 | End: 2023-06-21 | Stop reason: HOSPADM

## 2023-06-17 RX ORDER — LISINOPRIL 5 MG/1
5 TABLET ORAL DAILY
Status: DISCONTINUED | OUTPATIENT
Start: 2023-06-17 | End: 2023-06-21 | Stop reason: HOSPADM

## 2023-06-17 RX ORDER — POLYETHYLENE GLYCOL 3350 17 G/17G
17 POWDER, FOR SOLUTION ORAL DAILY PRN
Status: DISCONTINUED | OUTPATIENT
Start: 2023-06-17 | End: 2023-06-21 | Stop reason: HOSPADM

## 2023-06-17 RX ORDER — HYDRALAZINE HYDROCHLORIDE 20 MG/ML
10 INJECTION INTRAMUSCULAR; INTRAVENOUS
Status: DISCONTINUED | OUTPATIENT
Start: 2023-06-17 | End: 2023-06-17 | Stop reason: HOSPADM

## 2023-06-17 RX ORDER — ONDANSETRON 2 MG/ML
4 INJECTION INTRAMUSCULAR; INTRAVENOUS EVERY 6 HOURS PRN
Status: DISCONTINUED | OUTPATIENT
Start: 2023-06-17 | End: 2023-06-21 | Stop reason: HOSPADM

## 2023-06-17 RX ORDER — SODIUM CHLORIDE 0.9 % (FLUSH) 0.9 %
5-40 SYRINGE (ML) INJECTION EVERY 12 HOURS SCHEDULED
Status: DISCONTINUED | OUTPATIENT
Start: 2023-06-17 | End: 2023-06-17 | Stop reason: HOSPADM

## 2023-06-17 RX ORDER — ACETAMINOPHEN 325 MG/1
650 TABLET ORAL EVERY 6 HOURS
Status: DISCONTINUED | OUTPATIENT
Start: 2023-06-17 | End: 2023-06-21 | Stop reason: HOSPADM

## 2023-06-17 RX ORDER — IBUPROFEN 200 MG
TABLET ORAL DAILY
Status: DISCONTINUED | OUTPATIENT
Start: 2023-06-17 | End: 2023-06-21 | Stop reason: HOSPADM

## 2023-06-17 RX ORDER — ONDANSETRON 2 MG/ML
4 INJECTION INTRAMUSCULAR; INTRAVENOUS
Status: DISCONTINUED | OUTPATIENT
Start: 2023-06-17 | End: 2023-06-17 | Stop reason: HOSPADM

## 2023-06-17 RX ORDER — DEXAMETHASONE SODIUM PHOSPHATE 10 MG/ML
10 INJECTION, SOLUTION INTRAMUSCULAR; INTRAVENOUS EVERY 8 HOURS
Status: COMPLETED | OUTPATIENT
Start: 2023-06-17 | End: 2023-06-18

## 2023-06-17 RX ORDER — MEPERIDINE HYDROCHLORIDE 25 MG/ML
12.5 INJECTION INTRAMUSCULAR; INTRAVENOUS; SUBCUTANEOUS EVERY 5 MIN PRN
Status: DISCONTINUED | OUTPATIENT
Start: 2023-06-17 | End: 2023-06-17 | Stop reason: HOSPADM

## 2023-06-17 RX ORDER — SODIUM CHLORIDE 9 MG/ML
INJECTION, SOLUTION INTRAVENOUS PRN
Status: DISCONTINUED | OUTPATIENT
Start: 2023-06-17 | End: 2023-06-21 | Stop reason: HOSPADM

## 2023-06-17 RX ORDER — OXYMETAZOLINE HYDROCHLORIDE 0.05 G/100ML
2 SPRAY NASAL ONCE
Status: ACTIVE | OUTPATIENT
Start: 2023-06-17 | End: 2023-06-20

## 2023-06-17 RX ORDER — DEXAMETHASONE SODIUM PHOSPHATE 10 MG/ML
10 INJECTION INTRAMUSCULAR; INTRAVENOUS ONCE
Status: COMPLETED | OUTPATIENT
Start: 2023-06-17 | End: 2023-06-17

## 2023-06-17 RX ORDER — PROTAMINE SULFATE 10 MG/ML
50 INJECTION, SOLUTION INTRAVENOUS ONCE
Status: COMPLETED | OUTPATIENT
Start: 2023-06-17 | End: 2023-06-17

## 2023-06-17 RX ORDER — SODIUM CHLORIDE 0.9 % (FLUSH) 0.9 %
5-40 SYRINGE (ML) INJECTION PRN
Status: DISCONTINUED | OUTPATIENT
Start: 2023-06-17 | End: 2023-06-17 | Stop reason: HOSPADM

## 2023-06-17 RX ORDER — MIDAZOLAM HYDROCHLORIDE 2 MG/2ML
2 INJECTION, SOLUTION INTRAMUSCULAR; INTRAVENOUS
Status: DISCONTINUED | OUTPATIENT
Start: 2023-06-17 | End: 2023-06-17 | Stop reason: HOSPADM

## 2023-06-17 RX ORDER — METOPROLOL SUCCINATE 25 MG/1
25 TABLET, EXTENDED RELEASE ORAL EVERY EVENING
Status: DISCONTINUED | OUTPATIENT
Start: 2023-06-17 | End: 2023-06-21 | Stop reason: HOSPADM

## 2023-06-17 RX ORDER — PANTOPRAZOLE SODIUM 40 MG/1
40 TABLET, DELAYED RELEASE ORAL
Status: DISCONTINUED | OUTPATIENT
Start: 2023-06-18 | End: 2023-06-21 | Stop reason: HOSPADM

## 2023-06-17 RX ORDER — INSULIN LISPRO 100 [IU]/ML
0-4 INJECTION, SOLUTION INTRAVENOUS; SUBCUTANEOUS
Status: DISCONTINUED | OUTPATIENT
Start: 2023-06-17 | End: 2023-06-21 | Stop reason: HOSPADM

## 2023-06-17 RX ADMIN — POLYMYXIN B SULFATE, BACITRACIN ZINC, NEOMYCIN SULFATE: 5000; 3.5; 4 OINTMENT TOPICAL at 16:01

## 2023-06-17 RX ADMIN — AMPICILLIN SODIUM AND SULBACTAM SODIUM 3000 MG: 2; 1 INJECTION, POWDER, FOR SOLUTION INTRAMUSCULAR; INTRAVENOUS at 10:41

## 2023-06-17 RX ADMIN — AMPICILLIN SODIUM AND SULBACTAM SODIUM 3000 MG: 2; 1 INJECTION, POWDER, FOR SOLUTION INTRAMUSCULAR; INTRAVENOUS at 22:14

## 2023-06-17 RX ADMIN — LEVOTHYROXINE SODIUM 200 MCG: 100 TABLET ORAL at 15:54

## 2023-06-17 RX ADMIN — DEXAMETHASONE SODIUM PHOSPHATE 10 MG: 10 INJECTION, SOLUTION INTRAMUSCULAR; INTRAVENOUS at 16:01

## 2023-06-17 RX ADMIN — DEXAMETHASONE SODIUM PHOSPHATE 10 MG: 10 INJECTION INTRAMUSCULAR; INTRAVENOUS at 05:44

## 2023-06-17 RX ADMIN — CITALOPRAM HYDROBROMIDE 20 MG: 20 TABLET ORAL at 19:57

## 2023-06-17 RX ADMIN — PROTAMINE SULFATE 50 MG: 10 INJECTION, SOLUTION INTRAVENOUS at 05:47

## 2023-06-17 RX ADMIN — ATORVASTATIN CALCIUM 80 MG: 40 TABLET, FILM COATED ORAL at 19:57

## 2023-06-17 RX ADMIN — Medication 10 ML: at 19:59

## 2023-06-17 RX ADMIN — SODIUM CHLORIDE, POTASSIUM CHLORIDE, SODIUM LACTATE AND CALCIUM CHLORIDE: 600; 310; 30; 20 INJECTION, SOLUTION INTRAVENOUS at 09:10

## 2023-06-17 RX ADMIN — ACETAMINOPHEN 650 MG: 325 TABLET ORAL at 19:57

## 2023-06-17 RX ADMIN — DEXAMETHASONE SODIUM PHOSPHATE 10 MG: 10 INJECTION, SOLUTION INTRAMUSCULAR; INTRAVENOUS at 22:14

## 2023-06-17 RX ADMIN — AMPICILLIN SODIUM AND SULBACTAM SODIUM 3000 MG: 2; 1 INJECTION, POWDER, FOR SOLUTION INTRAMUSCULAR; INTRAVENOUS at 17:57

## 2023-06-17 RX ADMIN — LISINOPRIL 5 MG: 5 TABLET ORAL at 15:55

## 2023-06-17 RX ADMIN — SODIUM CHLORIDE, POTASSIUM CHLORIDE, SODIUM LACTATE AND CALCIUM CHLORIDE: 600; 310; 30; 20 INJECTION, SOLUTION INTRAVENOUS at 17:54

## 2023-06-17 RX ADMIN — IOPAMIDOL 75 ML: 755 INJECTION, SOLUTION INTRAVENOUS at 05:09

## 2023-06-17 RX ADMIN — METOPROLOL SUCCINATE 25 MG: 25 TABLET, EXTENDED RELEASE ORAL at 17:57

## 2023-06-17 RX ADMIN — CALCIUM CARBONATE-VITAMIN D TAB 500 MG-200 UNIT 2 TABLET: 500-200 TAB at 15:55

## 2023-06-17 RX ADMIN — CALCIUM CARBONATE-VITAMIN D TAB 500 MG-200 UNIT 2 TABLET: 500-200 TAB at 19:56

## 2023-06-17 ASSESSMENT — PAIN SCALES - GENERAL
PAINLEVEL_OUTOF10: 2
PAINLEVEL_OUTOF10: 0
PAINLEVEL_OUTOF10: 0
PAINLEVEL_OUTOF10: 2

## 2023-06-17 ASSESSMENT — ENCOUNTER SYMPTOMS
SORE THROAT: 0
TROUBLE SWALLOWING: 0
VOICE CHANGE: 1
SINUS PAIN: 0
COUGH: 1
WHEEZING: 0
SHORTNESS OF BREATH: 0
RHINORRHEA: 0
GASTROINTESTINAL NEGATIVE: 1
STRIDOR: 0
PHOTOPHOBIA: 0
COLOR CHANGE: 0
EYES NEGATIVE: 1
TROUBLE SWALLOWING: 1
CHOKING: 0
SINUS PRESSURE: 0
COUGH: 0

## 2023-06-17 ASSESSMENT — PAIN DESCRIPTION - DESCRIPTORS
DESCRIPTORS: SORE
DESCRIPTORS: SORE

## 2023-06-17 ASSESSMENT — PAIN DESCRIPTION - LOCATION
LOCATION: THROAT
LOCATION: THROAT

## 2023-06-17 ASSESSMENT — PAIN - FUNCTIONAL ASSESSMENT: PAIN_FUNCTIONAL_ASSESSMENT: NONE - DENIES PAIN

## 2023-06-17 NOTE — ED PROVIDER NOTES
[address] [facility]        Pt Name: Laura Dee  MRN: [id number]  [address] [facility] 1955  Date of evaluation: 6/17/2023  Provider: Nadiya Jimenez DO  PCP: Airam Medeiros DO  Note Started: 4:37 AM EDT 6/17/23    CHIEF COMPLAINT       Chief Complaint   Patient presents with    Other     Increased neck swelling s/p Thyroidectomy on 6/12: currently having difficulty talking. Breathing is wnl. HISTORY OF PRESENT ILLNESS: 1 or more Elements     History from : Patient and Family      Limitations to history : None    Laura Dee is a 78 y.o. male who has history of nstemi, cva, dvt, antiphospholipid sybndrome on lovenox which he took last night and first dose of coumadin. Pt started lovenox wed. presents for neck swelling. Pt had thyroid surgery Monday w dr Mary Starr at [facility]. Pt notes increased swelling. Pt notes no trouble swallowing last night at dinner time. He has had increased swelling denies any trouble breathing at rest.     Nursing Notes were all reviewed and agreed with or any disagreements were addressed in the HPI. REVIEW OF SYSTEMS :      Review of Systems   Constitutional:  Negative for chills, fever and unexpected weight change. HENT:  Negative for congestion. Eyes:  Negative for photophobia and visual disturbance. Respiratory:  Negative for cough and shortness of breath. Cardiovascular:  Negative for chest pain and palpitations. Endocrine: Negative for polyphagia and polyuria. Genitourinary:  Negative for difficulty urinating, flank pain and frequency. Allergic/Immunologic: Negative for immunocompromised state. Neurological:  Negative for dizziness and headaches. Positives and Pertinent negatives as per HPI.      SURGICAL HISTORY     Past Surgical History:   Procedure Laterality Date    CORONARY ANGIOPLASTY WITH STENT PLACEMENT  11/11/2016    PCI with stent implant to LAD

## 2023-06-17 NOTE — PROGRESS NOTES
Report called to ICU RN. Clamps applied to LYUDMILA drain to hold continuous suction during transport per [facility]. Patient transferred upstairs with continuous monitoring and two nurse assist. No acute events noted. Family updated in waiting room. All belongings are with his spouse.

## 2023-06-17 NOTE — CONSULTS
[address] [facility] 2W ICU  [address]  [facility],[address] [id number]  Dept: [phone]  Loc: [phone]    Inpatient Medical Oncology/Hematology Consult Note    Patient Name: Wolf Bartholomew  YOB: 1955    DATE OF ADMISSION: 6/17/2023  DATE OF CONSULTATION: 6/17/2023  CONSULTING PROVIDER: Jocelyn Parham DO  REASON FOR CONSULTATION: \"history of antiphospholipid syndrome s/p post op neck hematoma\"  PCP: Laura Alicea    Room: 0212/0212-A      CHIEF COMPLAINT:  Neck swelling and shortness of breath    HISTORY OF PRESENT ILLNESS (06/17/23): Patient is a 78 y.o. male who presented with neck swelling and shortness of breath, was found to have a neck hematoma in the setting of recent total thyroidectomy. Patient had recently been found to have triple positive antiphospholipid syndrome. She does have previous history of history of DVT, approximately a year ago. She also reported having a stroke/TIA back around 2008 and an MI in 2016. In addition to being known to our service, she was seen by hematology at [facility], in which she was recommended to start Lovenox after his surgery, bridging to warfarin. Patient had taken only 1 dose of warfarin prior to admission. The patient reported that he initially tolerated surgery well without major issues. Surgery was performed on 6/12/2023. The patient then had anterior neck swelling around Thursday, which had worsened, and had increased shortness of breath, when flexing his neck downwards. He then presented to the hospital, in which a 12.2 x 4.8 cm hematoma was found. Earlier this morning, patient went back to the OR for hematoma evacuation. I saw patient today in the ICU, in which she has drains placed in. He is in no acute distress, denies of shortness of breath. He reports having cough, but improved since yesterday. Furthermore he is able to tolerate liquids acceptably without issues.           HEMATOLOGIC HISTORY:   had presented with

## 2023-06-17 NOTE — ED NOTES
Name: Bhavani Bob  : 1955  MRN: [id number]    Date: 2023    Benefits of immediately proceeding with Radiology exam outweigh the risks and therefore the following is being waived:      [] Pregnancy test    [] Protocol for Iodine allergy    [] MRI questionnaire    [x] BUN/Creatinine        DO Chloé Hernandes DO  [id number]

## 2023-06-17 NOTE — ED NOTES
This RN walked with the patient from ED entrance to ED room 17 and placed patient on the pulse oximeter: O2 sat reading 82% with good pleth/wave form. Patient denies SOB but stated that it is \"difficult to breathe\" when he tilts his head forward/towards chest. Patient's O2 sat recovered within a minute to mid 90's. Patient's wife with patient.       1210  27 N, RN  06/17/23 6820

## 2023-06-17 NOTE — CONSULTS
Critical Care Admit/Consult Note         Patient - Emi Mcintosh   MRN -  [id number]   [facility]   - 1955      Date of Admission -  2023  4:22 AM  Date of evaluation -  2023021-A   Hospital Day - 0            ADMIT/CONSULT DETAILS     Reason for Admit/Consult   Post operative hematoma     Consulting Amanda Reyes MD  Primary Care Physician - Beatriz Marshall DO         HPI   Emi Mcintosh is a 78 y.o. male 5 days s/p thyroidectomy who was sent to the ICU for post operative neck swelling. Pt underwent total thyroidectomy with Dr. Yady Smith . He does have a history of antiphospholipid syndrome and resumed lovenox 24 hours after surgery and started on his coumadin one day ago. Over the last few days he has had worsening neck swelling with associated difficulty swallowing. This morning he presented to the ED for worsening neck swelling with voice changes and increased difficulty swallowing. He was able to lie flat for CT scan in ED but reports some difficulty breathing when laying flat or walking. His INR in the ED was 1.2. CT scan reviewed consistent with post op hematoma.          Past Medical History         Diagnosis Date    CAD (coronary artery disease)     Cardiomyopathy ([address])     Cerebrovascular disease     DVT (deep venous thrombosis) (HCC)     Gastro-esophageal reflux     Hx of blood clots     Insulin resistance     TIA (transient ischemic attack)         Past Surgical History           Procedure Laterality Date    CORONARY ANGIOPLASTY WITH STENT PLACEMENT  2016    PCI with stent implant to LAD    HERNIA REPAIR      KNEE SURGERY Bilateral     80s-ARTHROSCOPIC    THYROIDECTOMY N/A 2023    TOTAL THYROIDECTOMY WITH NERVE INTEGRITY MONITOR performed by Royal Rose DO at [address] TISSUE NECK/THORAX  2020    US BIOPSY SOFT TISSUE NECK/THORAX 11/3/2020 [facility]       SOCIAL AND

## 2023-06-17 NOTE — PROGRESS NOTES
Attending Physician Attestation: Dr. Filiberto Yancey    Thank you very much for allowing me to see this patient in consultation and follow up. I personally saw, examined and provided care for the patient. Radiographs, labs and medication list were reviewed by me independently. I spoke with bedside nursing, respiratory therapists and consultants. Critical care services and times documented are independent of procedures and multidisciplinary rounds with Residents. Additionally comprehensive, multidisciplinary rounds were conducted with the MICU team. The case was discussed in detail and plans for care were established. Review of Residents documentation was conducted and revisions were made as appropriate. I agree with the the above documented information.      Current Facility-Administered Medications   Medication Dose Route Frequency Provider Last Rate Last Admin    lactated ringers IV soln infusion   IntraVENous Continuous Heri Booth,  mL/hr at 06/17/23 0910 New Bag at 06/17/23 0910    oxymetazoline (AFRIN) 0.05 % nasal spray 2 spray  2 spray Each Nostril Once Heri Booth, DO        sodium chloride flush 0.9 % injection 5-40 mL  5-40 mL IntraVENous 2 times per day Adela [place], DO        sodium chloride flush 0.9 % injection 5-40 mL  5-40 mL IntraVENous PRN Heri Booth, DO        0.9 % sodium chloride infusion   IntraVENous PRN Heri Booth, DO        polyethylene glycol (GLYCOLAX) packet 17 g  17 g Oral Daily PRN Heri Booth, DO        ondansetron (ZOFRAN-ODT) disintegrating tablet 4 mg  4 mg Oral Q8H PRN Adela [place], DO        Or    ondansetron (ZOFRAN) injection 4 mg  4 mg IntraVENous Q6H PRN Adela [place], DO        acetaminophen (TYLENOL) tablet 650 mg  650 mg Oral Q6H Heri Booth, DO        oxyCODONE (ROXICODONE) immediate release tablet 5 mg  5 mg Oral Q4H PRN Lorette [place], DO        Or    oxyCODONE (ROXICODONE) immediate release tablet 10 mg  10 mg Oral Q4H PRN Heri Booth, DO        dexamethasone (PF) (DECADRON) injection 10 mg  10 mg

## 2023-06-17 NOTE — OP NOTE
The clot and fluid was evacuated and the wound was copiously irrigated. No specific arterial or venous bleeding was identified there was just diffuse oozing from surrounding tissue. Sheets of Surgicel were placed in the left carotid space where it appeared mostly bleeding was coming from. 1 g of Merry was placed in the wound as well. An active round drain was placed into the anterior neck and secured with a drain stitch using a 3-0 nylon. The deep layer of the incision was then closed with 3-0 Vicryl and the skin was approximated using 4-0 Prolene in simple running fashion. The patient was awake the during the entire procedure and stated that he could breathe and swallow better. The patient was then transferred to the PACU for monitoring and transferred to the ICU for close observation without incident. Dr. Phyliss Apley was present scrubbed and performed all major aspects of the procedure.        Electronically signed by Alexis Santillan DO on 6/17/2023 at 8:39 AM

## 2023-06-17 NOTE — CONSULTS
OTOLARYNGOLOGY  CONSULT NOTE  6/17/2023    Physician Consulted: Dr. Raiza Burnett  Reason for Consult: Post op hematoma  Referring Physician: Dr. Sebastian Patient    HPI  Kristina Herzog is a 78 y.o. male who ENT was consulted for evaluation of post operative neck swelling. Pt underwent total thyroidectomy with Dr. Braden Stevenson 6/12. He does have a history of antiphospholipid syndrome and resumed lovenox 24 hours after surgery and started on his coumadin one day ago. Over the last few days he has had worsening neck swelling with associated difficulty swallowing. This morning he presented to the ED for worsening neck swelling with voice changes and increased difficulty swallowing. He was able to lie flat for CT scan in ED but reports some difficulty breathing when laying flat or walking. His INR in the ED was 1.2. CT scan reviewed consistent with post op hematoma. Review of Systems   Constitutional:  Negative for activity change, fatigue and fever. HENT:  Positive for trouble swallowing and voice change. Negative for congestion, ear discharge, ear pain, hearing loss, nosebleeds, postnasal drip, rhinorrhea, sinus pressure, sinus pain, sore throat and tinnitus. Respiratory:  Negative for cough, choking, wheezing and stridor. Cardiovascular:  Negative for chest pain. Gastrointestinal: Negative. Genitourinary: Negative. Skin:  Negative for color change and rash. Neurological:  Negative for speech difficulty, light-headedness, numbness and headaches. Hematological:  Negative for adenopathy. Psychiatric/Behavioral:  Negative for behavioral problems.       Past Medical History:   Diagnosis Date    CAD (coronary artery disease)     Cardiomyopathy ([address])     Cerebrovascular disease     DVT (deep venous thrombosis) (HCC)     Gastro-esophageal reflux     Hx of blood clots     Insulin resistance     TIA (transient ischemic attack)        Past Surgical History:   Procedure Laterality Date    CORONARY ANGIOPLASTY WITH

## 2023-06-17 NOTE — H&P
OTOLARYNGOLOGY  H&P  6/17/2023     Physician Consulted: Dr. Deidre Garcias  Reason for Consult: Post op hematoma  Referring Physician: Dr. Capri CHERY  Alyse Ramos is a 78 y.o. male who ENT was consulted for evaluation of post operative neck swelling. Pt underwent total thyroidectomy with Dr. Nolan Ambrocio 6/12. He does have a history of antiphospholipid syndrome and resumed lovenox 24 hours after surgery and started on his coumadin one day ago. Over the last few days he has had worsening neck swelling with associated difficulty swallowing. This morning he presented to the ED for worsening neck swelling with voice changes and increased difficulty swallowing. He was able to lie flat for CT scan in ED but reports some difficulty breathing when laying flat or walking. His INR in the ED was 1.2. CT scan reviewed consistent with post op hematoma. Review of Systems   Constitutional:  Negative for activity change, fatigue and fever. HENT:  Positive for trouble swallowing and voice change. Negative for congestion, ear discharge, ear pain, hearing loss, nosebleeds, postnasal drip, rhinorrhea, sinus pressure, sinus pain, sore throat and tinnitus. Respiratory:  Negative for cough, choking, wheezing and stridor. Cardiovascular:  Negative for chest pain. Gastrointestinal: Negative. Genitourinary: Negative. Skin:  Negative for color change and rash. Neurological:  Negative for speech difficulty, light-headedness, numbness and headaches. Hematological:  Negative for adenopathy. Psychiatric/Behavioral:  Negative for behavioral problems.        Past Medical History        Past Medical History:   Diagnosis Date    CAD (coronary artery disease)      Cardiomyopathy ([address])      Cerebrovascular disease      DVT (deep venous thrombosis) (HCC)      Gastro-esophageal reflux      Hx of blood clots      Insulin resistance      TIA (transient ischemic attack)              Past Surgical History         Past

## 2023-06-18 LAB
ALBUMIN SERPL-MCNC: 3.4 G/DL (ref 3.5–5.2)
ALP SERPL-CCNC: 89 U/L (ref 40–129)
ALT SERPL-CCNC: 51 U/L (ref 0–40)
ANION GAP SERPL CALCULATED.3IONS-SCNC: 7 MMOL/L (ref 7–16)
AST SERPL-CCNC: 14 U/L (ref 0–39)
BASOPHILS # BLD: 0.01 E9/L (ref 0–0.2)
BASOPHILS NFR BLD: 0.1 % (ref 0–2)
BILIRUB SERPL-MCNC: 0.9 MG/DL (ref 0–1.2)
BUN SERPL-MCNC: 19 MG/DL (ref 6–23)
CALCIUM SERPL-MCNC: 9.4 MG/DL (ref 8.6–10.2)
CHLORIDE SERPL-SCNC: 102 MMOL/L (ref 98–107)
CO2 SERPL-SCNC: 31 MMOL/L (ref 22–29)
CREAT SERPL-MCNC: 0.6 MG/DL (ref 0.7–1.2)
EKG ATRIAL RATE: 62 BPM
EKG P AXIS: 37 DEGREES
EKG P-R INTERVAL: 144 MS
EKG Q-T INTERVAL: 396 MS
EKG QRS DURATION: 94 MS
EKG QTC CALCULATION (BAZETT): 401 MS
EKG R AXIS: -31 DEGREES
EKG T AXIS: 29 DEGREES
EKG VENTRICULAR RATE: 62 BPM
EOSINOPHIL # BLD: 0 E9/L (ref 0.05–0.5)
EOSINOPHIL NFR BLD: 0 % (ref 0–6)
ERYTHROCYTE [DISTWIDTH] IN BLOOD BY AUTOMATED COUNT: 13.2 FL (ref 11.5–15)
GLUCOSE SERPL-MCNC: 149 MG/DL (ref 74–99)
HCT VFR BLD AUTO: 41.3 % (ref 37–54)
HGB BLD-MCNC: 13.6 G/DL (ref 12.5–16.5)
IMM GRANULOCYTES # BLD: 0.05 E9/L
IMM GRANULOCYTES NFR BLD: 0.5 % (ref 0–5)
INR BLD: 1.4
LYMPHOCYTES # BLD: 0.51 E9/L (ref 1.5–4)
LYMPHOCYTES NFR BLD: 5.3 % (ref 20–42)
MAGNESIUM SERPL-MCNC: 2.1 MG/DL (ref 1.6–2.6)
MCH RBC QN AUTO: 29.5 PG (ref 26–35)
MCHC RBC AUTO-ENTMCNC: 32.9 % (ref 32–34.5)
MCV RBC AUTO: 89.6 FL (ref 80–99.9)
METER GLUCOSE: 136 MG/DL (ref 74–99)
METER GLUCOSE: 137 MG/DL (ref 74–99)
METER GLUCOSE: 168 MG/DL (ref 74–99)
METER GLUCOSE: 230 MG/DL (ref 74–99)
MONOCYTES # BLD: 0.33 E9/L (ref 0.1–0.95)
MONOCYTES NFR BLD: 3.4 % (ref 2–12)
NEUTROPHILS # BLD: 8.77 E9/L (ref 1.8–7.3)
NEUTS SEG NFR BLD: 90.7 % (ref 43–80)
PHOSPHATE SERPL-MCNC: 4.5 MG/DL (ref 2.5–4.5)
PLATELET # BLD AUTO: 194 E9/L (ref 130–450)
PMV BLD AUTO: 9.8 FL (ref 7–12)
POTASSIUM SERPL-SCNC: 4.3 MMOL/L (ref 3.5–5)
PROT SERPL-MCNC: 5.9 G/DL (ref 6.4–8.3)
PROTHROMBIN TIME: 15.6 SEC (ref 9.3–12.4)
RBC # BLD AUTO: 4.61 E12/L (ref 3.8–5.8)
RBC MORPH BLD: NORMAL
SODIUM SERPL-SCNC: 140 MMOL/L (ref 132–146)
WBC # BLD: 9.7 E9/L (ref 4.5–11.5)

## 2023-06-18 PROCEDURE — 2580000003 HC RX 258: Performed by: INTERNAL MEDICINE

## 2023-06-18 PROCEDURE — 2580000003 HC RX 258

## 2023-06-18 PROCEDURE — 6360000002 HC RX W HCPCS: Performed by: INTERNAL MEDICINE

## 2023-06-18 PROCEDURE — 85610 PROTHROMBIN TIME: CPT

## 2023-06-18 PROCEDURE — 2700000000 HC OXYGEN THERAPY PER DAY

## 2023-06-18 PROCEDURE — 36415 COLL VENOUS BLD VENIPUNCTURE: CPT

## 2023-06-18 PROCEDURE — 94660 CPAP INITIATION&MGMT: CPT

## 2023-06-18 PROCEDURE — 6370000000 HC RX 637 (ALT 250 FOR IP): Performed by: INTERNAL MEDICINE

## 2023-06-18 PROCEDURE — 2060000000 HC ICU INTERMEDIATE R&B

## 2023-06-18 PROCEDURE — 6360000002 HC RX W HCPCS

## 2023-06-18 PROCEDURE — 84100 ASSAY OF PHOSPHORUS: CPT

## 2023-06-18 PROCEDURE — 83735 ASSAY OF MAGNESIUM: CPT

## 2023-06-18 PROCEDURE — 99233 SBSQ HOSP IP/OBS HIGH 50: CPT | Performed by: STUDENT IN AN ORGANIZED HEALTH CARE EDUCATION/TRAINING PROGRAM

## 2023-06-18 PROCEDURE — 82962 GLUCOSE BLOOD TEST: CPT

## 2023-06-18 PROCEDURE — 85025 COMPLETE CBC W/AUTO DIFF WBC: CPT

## 2023-06-18 PROCEDURE — 80053 COMPREHEN METABOLIC PANEL: CPT

## 2023-06-18 PROCEDURE — 93010 ELECTROCARDIOGRAM REPORT: CPT | Performed by: INTERNAL MEDICINE

## 2023-06-18 RX ADMIN — INSULIN LISPRO 1 UNITS: 100 INJECTION, SOLUTION INTRAVENOUS; SUBCUTANEOUS at 13:05

## 2023-06-18 RX ADMIN — AMPICILLIN SODIUM AND SULBACTAM SODIUM 3000 MG: 2; 1 INJECTION, POWDER, FOR SOLUTION INTRAMUSCULAR; INTRAVENOUS at 04:24

## 2023-06-18 RX ADMIN — AMPICILLIN SODIUM AND SULBACTAM SODIUM 3000 MG: 2; 1 INJECTION, POWDER, FOR SOLUTION INTRAMUSCULAR; INTRAVENOUS at 10:24

## 2023-06-18 RX ADMIN — DEXAMETHASONE SODIUM PHOSPHATE 10 MG: 10 INJECTION, SOLUTION INTRAMUSCULAR; INTRAVENOUS at 06:18

## 2023-06-18 RX ADMIN — POLYMYXIN B SULFATE, BACITRACIN ZINC, NEOMYCIN SULFATE: 5000; 3.5; 4 OINTMENT TOPICAL at 07:54

## 2023-06-18 RX ADMIN — ATORVASTATIN CALCIUM 80 MG: 40 TABLET, FILM COATED ORAL at 20:31

## 2023-06-18 RX ADMIN — POLYETHYLENE GLYCOL 3350 17 G: 17 POWDER, FOR SOLUTION ORAL at 20:31

## 2023-06-18 RX ADMIN — LISINOPRIL 5 MG: 5 TABLET ORAL at 07:54

## 2023-06-18 RX ADMIN — ACETAMINOPHEN 650 MG: 325 TABLET ORAL at 20:31

## 2023-06-18 RX ADMIN — PANTOPRAZOLE SODIUM 40 MG: 40 TABLET, DELAYED RELEASE ORAL at 06:18

## 2023-06-18 RX ADMIN — AMPICILLIN SODIUM AND SULBACTAM SODIUM 3000 MG: 2; 1 INJECTION, POWDER, FOR SOLUTION INTRAMUSCULAR; INTRAVENOUS at 20:30

## 2023-06-18 RX ADMIN — Medication 10 ML: at 20:32

## 2023-06-18 RX ADMIN — CITALOPRAM HYDROBROMIDE 20 MG: 20 TABLET ORAL at 20:31

## 2023-06-18 RX ADMIN — CALCIUM CARBONATE-VITAMIN D TAB 500 MG-200 UNIT 2 TABLET: 500-200 TAB at 07:54

## 2023-06-18 RX ADMIN — CALCIUM CARBONATE-VITAMIN D TAB 500 MG-200 UNIT 2 TABLET: 500-200 TAB at 20:31

## 2023-06-18 RX ADMIN — Medication 10 ML: at 07:59

## 2023-06-18 RX ADMIN — METOPROLOL SUCCINATE 25 MG: 25 TABLET, EXTENDED RELEASE ORAL at 17:56

## 2023-06-18 RX ADMIN — ACETAMINOPHEN 650 MG: 325 TABLET ORAL at 14:25

## 2023-06-18 RX ADMIN — LEVOTHYROXINE SODIUM 200 MCG: 100 TABLET ORAL at 06:18

## 2023-06-18 RX ADMIN — AMPICILLIN SODIUM AND SULBACTAM SODIUM 3000 MG: 2; 1 INJECTION, POWDER, FOR SOLUTION INTRAMUSCULAR; INTRAVENOUS at 16:43

## 2023-06-18 ASSESSMENT — PAIN SCALES - GENERAL
PAINLEVEL_OUTOF10: 0

## 2023-06-18 NOTE — PROGRESS NOTES
ecchymosis about 75% circumference of neck  Abdomen: soft, NT, ND  CVS: RRR, S1, S2, No S3 or S4  Respiratory: decreased breath sounds at lung bases, no focal wheezes noted  Extremities: no clubbing/cyanosis/edema  Neuro: awake, alert, phonates, moves all 4 extremities, intact sensation     ASSESSMENT:  Post-Operative Anterior Neck Hematoma  S/P 6/17 POST OPERATIVE NECK HEMATOMA AND SEROMA EVACUATION   no blood products required for transfusion   Decadron 10 mg IV q 8 hours  S/P Thyroidectomy 6/12/2023 - benign multi-nodular goiter  A. Left thyroid lobe, excision: Benign nodular goiter with calcification  right thyroid lobe and isthmus, excision: Benign nodular goiter with   calcification   H/O Antiphospholipid Antibody Syndrome   Obesity - BMI 39.3  H/O Positive CARTER - 1:160 12/2/2022  Hypertension  OK to restart Toprol q HS  Diabetes Mellitus, Non-Insulin Dependent   Insulin sliding scale   Vitamin D Deficiency   H/O ANGEL - compliant with CPAP mask of 5-6 cm H2O  Follows with [facility], consultation placed     In addition the following applies:    Check: serial labs, Anterior Neck Drain  Medication Alterations: N/A  Procedures: S/P 6/17 POST OPERATIVE NECK HEMATOMA AND SEROMA EVACUATION   Imaging: reviewed  New Consultations: N/A  VENT: N/A    Access: Peripheral,    Consults: ENT, Pulmonary, Hematology/Oncology   Drips: N/A  DVT Phylaxis: SCDs  GI Prophylaxis: PPI  Nutrition: NPO  ABX: Unasyn  Blood Products: NONE    - hold aspirin at this time  - follow up outpatient for PFT, sleep evaluation, follows with Dr. Luisito [facility] practice    - Drain output noted. 70 cc over 24 hours  - ideal to D/C drain prior to restart of anticoagulation  - supportive care to continue   - OK to floors, critical care to sign off    Thank you for allowing me to participate in the care of this patient.     Care reviewed with nursing staff, medical and surgical specialty care, primary care and the patient's

## 2023-06-18 NOTE — CONSULTS
Stefanie De La Rosa M.D.,[facility]  Hoy Curling, D.O., F.A.FATUMA.ORomeoI., Lupillo Zepeda M.D. Geovanni Nunes M.D. Inna Mendez D.O. Patient:  Shanel Appiah 78 y.o. male MRN: [id number]     Date of Service: 6/18/2023      PULMONARY CONSULTATION    Reason for Consultation: sleep apnea  Referring Physician: Faith Patel M.D. Communication with the referring physician will be sent via the electronic medical record. Chief Complaint: neck swelling and  SOB    CODE STATUS: Full Code    SUBJECTIVE:  HPI:  Shanel Appiah is a 78 y.o. male known to us with a PMH of CAD, cardiomyopathy, antiphospholipid syndrome, ANGEL who presented with neck swelling and shortness of breath, was found to have a neck hematoma in the setting of recent total thyroidectomy on 6/12/23. He started to develop anterior neck swelling around Thursday, which had worsened, and had increased shortness of breath, when flexing his neck downwards. He then presented to the hospital, in which a 12.2 x 4.8 cm hematoma was found. He was sent to the OR with ENT on 6/17/23 for evacuation of the hematoma. Darleen Allan was seen by our office in October 2022 for a sleep study where it was found he has severe obstructive sleep apnea with recommendations for auto-pap of 5-15. He does have a home device which he is compliant with. There is a hospital PAP machine at the bedside but he states it hurts his throat. I had advised him to have someone from home bring in his own device. Darleen Allan was seen today in the ICU sitting up in bed on 4 liters NC, SpO2 96%. He is alert and oriented. He states he feels pretty good and has some discomfort swallowing at times still but nothing serious. He has some neck swelling and bruising along the surgical site with staples on the incision line. A LYUDMILA drain is present with sanguinous fluid.       Past Medical History:   Diagnosis Date    CAD (coronary artery disease)     Cardiomyopathy ([address])     Cerebrovascular

## 2023-06-18 NOTE — PLAN OF CARE
Problem: Discharge Planning  Goal: Discharge to home or other facility with appropriate resources  Outcome: Progressing  Flowsheets (Taken 6/17/2023 2000)  Discharge to home or other facility with appropriate resources: Identify barriers to discharge with patient and caregiver     Problem: Safety - Adult  Goal: Free from fall injury  Outcome: Progressing  Flowsheets (Taken 6/18/2023 0000)  Free From Fall Injury: Instruct family/caregiver on patient safety     Problem: Pain  Goal: Verbalizes/displays adequate comfort level or baseline comfort level  Outcome: Progressing  Flowsheets  Taken 6/18/2023 0400  Verbalizes/displays adequate comfort level or baseline comfort level: Encourage patient to monitor pain and request assistance  Taken 6/18/2023 0000  Verbalizes/displays adequate comfort level or baseline comfort level: Encourage patient to monitor pain and request assistance

## 2023-06-18 NOTE — PROGRESS NOTES
Patient stated he may not wear hospital provided cpap tonight. Said wife may bring in home unit tomorrow if patient stays another night. Hospital cpap in room at bedside.

## 2023-06-18 NOTE — PROGRESS NOTES
Musculoskeletal:         General: No swelling. Cervical back: No tenderness. Right lower leg: No edema. Left lower leg: No edema. Skin:     Coloration: Skin is not pale. Findings: Bruising (neck\) present. No lesion. Neurological:      General: No focal deficit present. Mental Status: He is alert and oriented to person, place, and time. Psychiatric:         Mood and Affect: Mood normal.         Behavior: Behavior normal.         Thought Content: Thought content normal.            ECOG PS: 1      Intake/Output Summary (Last 24 hours) at 6/18/2023 0952  Last data filed at 6/18/2023 0400  Gross per 24 hour   Intake 1400.78 ml   Output 1320 ml   Net 80.78 ml         DIAGNOSTIC DATA:       Lab Results   Component Value Date    WBC 9.7 06/18/2023    HGB 13.6 06/18/2023    HCT 41.3 06/18/2023    MCV 89.6 06/18/2023     06/18/2023     Lab Results   Component Value Date    NEUTROABS 8.77 (H) 06/18/2023     Lab Results   Component Value Date    ALT 51 (H) 06/18/2023    AST 14 06/18/2023    ALKPHOS 89 06/18/2023    BILITOT 0.9 06/18/2023     Lab Results   Component Value Date    CREATININE 0.6 (L) 06/18/2023    BUN 19 06/18/2023     06/18/2023    K 4.3 06/18/2023     06/18/2023    CO2 31 (H) 06/18/2023       Pathology:     Radiology:      ASSESSMENT & PLAN:    Anterior neck hematoma, 12.2. x 4.8 cm, in the setting of total thyroidectomy and therapeutic anticoagulation use  Antiphospholipid syndrome, triple positive  Total thyroidectomy on 6/12/2023: Pathology reported benign nodular goiter with calcification  DON-1 4G/4G  MTHFR mutation, heterozygous  Factor XIII, heterozygous   Positive CARTER  CAD status post stenting of LAD in 2016  Reported history of stroke/TIA in ~2008    The patient is a 78 y.o. male was admitted to the ICU for anterior neck hematoma, in the setting of recent total thyroidectomy that was done on 6/12/2023. Hematoma was evacuated on 6/17/2023.   At that

## 2023-06-18 NOTE — PATIENT CARE CONFERENCE
Intensive Care Daily Quality Rounding Checklist      ICU Team Members: Bedside Nurse, , , and Nursing Unit Leadership    ICU Day #: NUMBER: 2    Intubation Date:      Ventilator Day #:     Central Line Insertion Date:          Day #:         Indication:      Arterial Line Insertion Date:        Day #:     Temporary Hemodialysis Catheter Insertion Date:        Day #     DVT Prophylaxis: Scd's     GI Prophylaxis: Protonix    Dorman Catheter Insertion Date:         Day #:       Indications:       Continued need (if yes, reason documented and discussed with physician):     Skin Issues/ Wounds and ordered treatment discussed on rounds: Surgical incision     Goals/ Plans for the Day: Labs - Report Results and Transfer - Obtain Order

## 2023-06-18 NOTE — PROGRESS NOTES
Looks much improved from yesterday. Drain output noted. 70 cc over 24 hours. Placed drain too bulb suction. Will monitor closely. Would prefer to remove drain prior to restarting anti-coagulation. Plan in place per hematology. Eating.  Voice clear

## 2023-06-19 LAB
ALBUMIN SERPL-MCNC: 3.2 G/DL (ref 3.5–5.2)
ALP SERPL-CCNC: 88 U/L (ref 40–129)
ALT SERPL-CCNC: 48 U/L (ref 0–40)
ANION GAP SERPL CALCULATED.3IONS-SCNC: 8 MMOL/L (ref 7–16)
ANTI DNA DOUBLE STRANDED: NEGATIVE
AST SERPL-CCNC: 18 U/L (ref 0–39)
BASOPHILS # BLD: 0.01 E9/L (ref 0–0.2)
BASOPHILS NFR BLD: 0.1 % (ref 0–2)
BILIRUB SERPL-MCNC: 1.1 MG/DL (ref 0–1.2)
BUN SERPL-MCNC: 28 MG/DL (ref 6–23)
CALCIUM SERPL-MCNC: 9.4 MG/DL (ref 8.6–10.2)
CHLORIDE SERPL-SCNC: 100 MMOL/L (ref 98–107)
CO2 SERPL-SCNC: 31 MMOL/L (ref 22–29)
CREAT SERPL-MCNC: 0.7 MG/DL (ref 0.7–1.2)
EOSINOPHIL # BLD: 0.03 E9/L (ref 0.05–0.5)
EOSINOPHIL NFR BLD: 0.2 % (ref 0–6)
ERYTHROCYTE [DISTWIDTH] IN BLOOD BY AUTOMATED COUNT: 13.2 FL (ref 11.5–15)
GLUCOSE SERPL-MCNC: 111 MG/DL (ref 74–99)
HCT VFR BLD AUTO: 41.9 % (ref 37–54)
HGB BLD-MCNC: 13.7 G/DL (ref 12.5–16.5)
IMM GRANULOCYTES # BLD: 0.1 E9/L
IMM GRANULOCYTES NFR BLD: 0.8 % (ref 0–5)
LYMPHOCYTES # BLD: 1.08 E9/L (ref 1.5–4)
LYMPHOCYTES NFR BLD: 8.1 % (ref 20–42)
MAGNESIUM SERPL-MCNC: 2.1 MG/DL (ref 1.6–2.6)
MCH RBC QN AUTO: 29.1 PG (ref 26–35)
MCHC RBC AUTO-ENTMCNC: 32.7 % (ref 32–34.5)
MCV RBC AUTO: 89.1 FL (ref 80–99.9)
METER GLUCOSE: 104 MG/DL (ref 74–99)
METER GLUCOSE: 104 MG/DL (ref 74–99)
METER GLUCOSE: 134 MG/DL (ref 74–99)
METER GLUCOSE: 98 MG/DL (ref 74–99)
MONOCYTES # BLD: 1.33 E9/L (ref 0.1–0.95)
MONOCYTES NFR BLD: 10 % (ref 2–12)
MRSA SPEC QL CULT: NORMAL
NEUTROPHILS # BLD: 10.71 E9/L (ref 1.8–7.3)
NEUTS SEG NFR BLD: 80.8 % (ref 43–80)
PHOSPHATE SERPL-MCNC: 3.5 MG/DL (ref 2.5–4.5)
PLATELET # BLD AUTO: 229 E9/L (ref 130–450)
PMV BLD AUTO: 10.1 FL (ref 7–12)
POTASSIUM SERPL-SCNC: 4.1 MMOL/L (ref 3.5–5)
PROT SERPL-MCNC: 5.9 G/DL (ref 6.4–8.3)
RBC # BLD AUTO: 4.7 E12/L (ref 3.8–5.8)
SODIUM SERPL-SCNC: 139 MMOL/L (ref 132–146)
WBC # BLD: 13.3 E9/L (ref 4.5–11.5)

## 2023-06-19 PROCEDURE — 2580000003 HC RX 258

## 2023-06-19 PROCEDURE — 94660 CPAP INITIATION&MGMT: CPT

## 2023-06-19 PROCEDURE — 85520 HEPARIN ASSAY: CPT

## 2023-06-19 PROCEDURE — 85025 COMPLETE CBC W/AUTO DIFF WBC: CPT

## 2023-06-19 PROCEDURE — 99232 SBSQ HOSP IP/OBS MODERATE 35: CPT | Performed by: STUDENT IN AN ORGANIZED HEALTH CARE EDUCATION/TRAINING PROGRAM

## 2023-06-19 PROCEDURE — 51798 US URINE CAPACITY MEASURE: CPT

## 2023-06-19 PROCEDURE — 6370000000 HC RX 637 (ALT 250 FOR IP): Performed by: INTERNAL MEDICINE

## 2023-06-19 PROCEDURE — 6360000002 HC RX W HCPCS: Performed by: STUDENT IN AN ORGANIZED HEALTH CARE EDUCATION/TRAINING PROGRAM

## 2023-06-19 PROCEDURE — 83735 ASSAY OF MAGNESIUM: CPT

## 2023-06-19 PROCEDURE — 51701 INSERT BLADDER CATHETER: CPT

## 2023-06-19 PROCEDURE — 82962 GLUCOSE BLOOD TEST: CPT

## 2023-06-19 PROCEDURE — 2060000000 HC ICU INTERMEDIATE R&B

## 2023-06-19 PROCEDURE — 84100 ASSAY OF PHOSPHORUS: CPT

## 2023-06-19 PROCEDURE — 6360000002 HC RX W HCPCS: Performed by: INTERNAL MEDICINE

## 2023-06-19 PROCEDURE — 80053 COMPREHEN METABOLIC PANEL: CPT

## 2023-06-19 PROCEDURE — 2580000003 HC RX 258: Performed by: INTERNAL MEDICINE

## 2023-06-19 PROCEDURE — 51702 INSERT TEMP BLADDER CATH: CPT

## 2023-06-19 PROCEDURE — 36415 COLL VENOUS BLD VENIPUNCTURE: CPT

## 2023-06-19 RX ORDER — 0.9 % SODIUM CHLORIDE 0.9 %
500 INTRAVENOUS SOLUTION INTRAVENOUS ONCE
Status: COMPLETED | OUTPATIENT
Start: 2023-06-19 | End: 2023-06-19

## 2023-06-19 RX ORDER — ENOXAPARIN SODIUM 100 MG/ML
100 INJECTION SUBCUTANEOUS 2 TIMES DAILY
Status: DISCONTINUED | OUTPATIENT
Start: 2023-06-19 | End: 2023-06-21 | Stop reason: HOSPADM

## 2023-06-19 RX ADMIN — ACETAMINOPHEN 650 MG: 325 TABLET ORAL at 20:44

## 2023-06-19 RX ADMIN — LEVOTHYROXINE SODIUM 200 MCG: 100 TABLET ORAL at 05:47

## 2023-06-19 RX ADMIN — ENOXAPARIN SODIUM 100 MG: 100 INJECTION SUBCUTANEOUS at 09:03

## 2023-06-19 RX ADMIN — POLYMYXIN B SULFATE, BACITRACIN ZINC, NEOMYCIN SULFATE: 5000; 3.5; 4 OINTMENT TOPICAL at 09:02

## 2023-06-19 RX ADMIN — ATORVASTATIN CALCIUM 80 MG: 40 TABLET, FILM COATED ORAL at 20:44

## 2023-06-19 RX ADMIN — ACETAMINOPHEN 650 MG: 325 TABLET ORAL at 03:20

## 2023-06-19 RX ADMIN — METOPROLOL SUCCINATE 25 MG: 25 TABLET, EXTENDED RELEASE ORAL at 17:04

## 2023-06-19 RX ADMIN — LISINOPRIL 5 MG: 5 TABLET ORAL at 09:00

## 2023-06-19 RX ADMIN — CITALOPRAM HYDROBROMIDE 20 MG: 20 TABLET ORAL at 20:44

## 2023-06-19 RX ADMIN — AMPICILLIN SODIUM AND SULBACTAM SODIUM 3000 MG: 2; 1 INJECTION, POWDER, FOR SOLUTION INTRAMUSCULAR; INTRAVENOUS at 10:46

## 2023-06-19 RX ADMIN — SODIUM CHLORIDE 500 ML: 9 INJECTION, SOLUTION INTRAVENOUS at 07:30

## 2023-06-19 RX ADMIN — Medication 10 ML: at 09:03

## 2023-06-19 RX ADMIN — CALCIUM CARBONATE-VITAMIN D TAB 500 MG-200 UNIT 2 TABLET: 500-200 TAB at 09:00

## 2023-06-19 RX ADMIN — PANTOPRAZOLE SODIUM 40 MG: 40 TABLET, DELAYED RELEASE ORAL at 05:47

## 2023-06-19 RX ADMIN — ENOXAPARIN SODIUM 100 MG: 100 INJECTION SUBCUTANEOUS at 20:46

## 2023-06-19 RX ADMIN — Medication 10 ML: at 21:57

## 2023-06-19 RX ADMIN — AMPICILLIN SODIUM AND SULBACTAM SODIUM 3000 MG: 2; 1 INJECTION, POWDER, FOR SOLUTION INTRAMUSCULAR; INTRAVENOUS at 17:09

## 2023-06-19 RX ADMIN — CALCIUM CARBONATE-VITAMIN D TAB 500 MG-200 UNIT 2 TABLET: 500-200 TAB at 21:59

## 2023-06-19 RX ADMIN — AMPICILLIN SODIUM AND SULBACTAM SODIUM 3000 MG: 2; 1 INJECTION, POWDER, FOR SOLUTION INTRAMUSCULAR; INTRAVENOUS at 03:19

## 2023-06-19 RX ADMIN — AMPICILLIN SODIUM AND SULBACTAM SODIUM 3000 MG: 2; 1 INJECTION, POWDER, FOR SOLUTION INTRAMUSCULAR; INTRAVENOUS at 20:53

## 2023-06-19 ASSESSMENT — PAIN SCALES - GENERAL
PAINLEVEL_OUTOF10: 0

## 2023-06-19 NOTE — PROGRESS NOTES
OTOLARYNGOLOGY  DAILY PROGRESS NOTE  2023    Subjective:     Doing well. Has not voided well. Bladder scan 700cc. Drain output has slowed, ~30cc sanguinous output overnight    Objective:     /71   Pulse 56   Temp 98 °F (36.7 °C)   Resp 18   Ht 6' (1.829 m)   Wt (!) 305 lb 4.8 oz (138.5 kg)   SpO2 94%   BMI 41.41 kg/m²   PULSE OXIMETRY RANGE: SpO2  Av.5 %  Min: 92 %  Max: 97 %  I/O last 3 completed shifts: In: .8 [I.V.:1850.8; IV Piggyback:200]  Out: 1370 [Urine:1250; Drains:120]    GENERAL:  Awake, alert, cooperative, no apparent distress  HENT: Diffuse ecchymosis, soft. Drain with sanguineous output. EYES: No sclera icterus, pupils equal  LUNGS:  No increased work of breathing, no stridor  CARDIOVASCULAR:  RR      Assessment/Plan:     78 y.o. male with h/o of total thyroidectomy  with Dr. Genaro Leija s/p evacuation of neck hematoma      - 500cc bolus given, encouraged fluid intake and ambulation to help with voiding. Will straight cath if still has not voided in next few hours. - Discussed with Dr. Ama Penn, plan to start therapeutic lovenox this morning.   - Will pull drain  in afternoon if drain output still under 30cc  - anticipate discharge  if hospital course still uneventful. Patient seen and examined by resident, discussed with attending on call.       Electronically signed by Leon Dillard DO on 2023 at 6:54 AM

## 2023-06-19 NOTE — PROGRESS NOTES
Patient with urinary retention. Only able to void 5oml today. Straight cath at 7am for 1900ml's this AM. No C/O  fullness. Bladder scan x 2 with 550ml residual. Dorman placed with immediate return of 750ml of clear yellow urine. Patient tolerated well. Will monitor.

## 2023-06-19 NOTE — ACP (ADVANCE CARE PLANNING)
Advance Care Planning   Healthcare Decision Maker:    Primary Decision Maker: MartinaYissel - [facility] - [phone]      Today we documented Decision Maker(s) consistent with Legal Next of Kin hierarchy.

## 2023-06-19 NOTE — PROGRESS NOTES
[facility]  SEB 6W MED SURG  [address]  [place] [address]  Dept: [phone]  Loc: [phone]    Inpatient Medical Oncology/Hematology Progress Note    Patient Name: Yen Dawn  YOB: 1955    DATE OF ADMISSION: 6/17/2023  DATE OF CONSULTATION: 6/17/2023  CONSULTING PROVIDER: Oneal Zavaleta DO  REASON FOR CONSULTATION: \"history of antiphospholipid syndrome s/p post op neck hematoma\"  PCP: Anna Taylor    Room: 0615/0615-A      CHIEF COMPLAINT:  Neck swelling and shortness of breath    Subjective:  Patient now out of ICU. No major events. He is eating breakfast.   Denies of bleeding, swelling or shortness of breath. Doesn't feel his neck is getting swollen. LYUDMILA still intact. HPI from Initial Inpatient Consultation (06/17/23): Patient is a 78 y.o. male who presented with neck swelling and shortness of breath, was found to have a neck hematoma in the setting of recent total thyroidectomy. Patient had recently been found to have triple positive antiphospholipid syndrome. She does have previous history of history of DVT, approximately a year ago. She also reported having a stroke/TIA back around 2008 and an MI in 2016. In addition to being known to our service, she was seen by hematology at [facility], in which she was recommended to start Lovenox after his surgery, bridging to warfarin. Patient had taken only 1 dose of warfarin prior to admission. The patient reported that he initially tolerated surgery well without major issues. Surgery was performed on 6/12/2023. The patient then had anterior neck swelling around Thursday, which had worsened, and had increased shortness of breath, when flexing his neck downwards. He then presented to the hospital, in which a 12.2 x 4.8 cm hematoma was found. Earlier this morning, patient went back to the OR for hematoma evacuation. I saw patient today in the ICU, in which she has drains placed in.   He is in no acute thyroidectomy on 6/12/2023: Pathology reported benign nodular goiter with calcification  DON-1 4G/4G  MTHFR mutation, heterozygous  Factor XIII, heterozygous   Positive CARTER  CAD status post stenting of LAD in 2016  Reported history of stroke/TIA in ~2008    The patient is a 78 y.o. male was admitted to the ICU for anterior neck hematoma, in the setting of recent total thyroidectomy that was done on 6/12/2023. Hematoma was evacuated on 6/17/2023. At that time, he had been started on Lovenox per hematology recommendations at [facility], bridging to warfarin. He had only taken 1 dose of warfarin prior to his admission. Remains ICU and stable without major events or alarming symptoms or exam findings  Continuing to hold anticoagulation and home aspirin  LYUDMILA drain intact with bloody outpatient, approximately 70 cc last 24 hours  Discussed with ENT yesterday. May hold anticoagulation for about 48 hours before restarting. If need be, will consider starting sooner if issues occur. ENT would like to remove LYUDMILA drain before restarting AC  Will restart Lovenox this morning, at reduced dose of 100 mg BID (was taking 120 mg/0.8 mL BID per [facility]); will check LMW/fractionate Anti-Xa 4 hours after first dose of Lovenox. I discussed with RN  Pt has good renal function  Labs reviewed. I have reviewed orders for anti-dsDNA and histone Ab's. I advised patient to let us know if he has acute symptoms such has sudden shortness of breath, or swelling. Will eventually need to transition to warfarin, which potentially may have to be done as an outpatient.             Juliana Wells MD  [address]  6/19/2023    [facility]) Office  P: [phone]  F: [phone]    [address]) Office  P: [phone]  F: [phone]

## 2023-06-19 NOTE — PROGRESS NOTES
results for input(s): STREPNEUMAGU in the last 72 hours. No results for input(s): LP1UAG in the last 72 hours. Assessment:    Severe obstructive sleep apnea-CPAP compliant  Former nicotine dependence 1.5 ppd quit 2008  Post operative anterior neck hematoma-s/p evacuation 6/17/23  Recent thyroidectomy 6/12/2023  Triple positive Antiphospholipid antibody syndrome-follows at [facility]  Hx DVT  MTHFR mutation, heterozygous  Factor XIII , heterozygous  Morbid Obestiy-BMI 39  Positive CARTER, positive Beta-2 Glyco 1 IgG x2 and weakly positive lupus anticoagulant  Hx TIA/stroke 2008   CAD hx Stenting LAD 2016    Plan:   Nocturnal CPAP, holding off due to neck soreness. Lovenox 100 mg BID. Per hematology recommendations may resume Warfarin soon , hold off until RANDOLPH drain removed per ENT rec's  Plan to remove randolph drain this afternoon per ENT. 65 cc output recorded yesterday. Unasyn started 6/17/23 in ICU for prophylaxis. may convert to augmentin for dc to complete 7 total days. Follow CXR  Local wound care  PT, OT  DVT, GI prophylaxis  Tentative dc in next 24 hrs. Follows at [facility] hematology for antiphospholipid antibody syndrome. Recommend tx with warfarin, superior over DOAC. INR goal 2.0-3.0  For hx DVT, completed  tx 4 months of eliquis then baby aspirin after R peroneal dvt 7/2022. Previous work up by rheumatology revealed no underlying connective tissue disease for + CARTER. This plan of care was reviewed in collaboration with Dr. Sergio Farley  Electronically signed by DYLAN Deng - CNP on 6/19/2023 at 10:22 AM      I personally saw, examined, and cared for the patient. Labs, medications, radiographs reviewed. I agree with history exam and plans detailed in NP note.    Fausto Lopez MD

## 2023-06-19 NOTE — CARE COORDINATION
Social work / Discharge Planning:        Patient is a readmission from home with his wife. They live in a ranch home. Patient is up and ambulating around the halls independently. He has a home cpap. No discharge needs identified.    Electronically signed by CARMEN Guillen on 6/19/2023 at 10:50 AM

## 2023-06-20 LAB
ALBUMIN SERPL-MCNC: 3.1 G/DL (ref 3.5–5.2)
ALP SERPL-CCNC: 93 U/L (ref 40–129)
ALT SERPL-CCNC: 36 U/L (ref 0–40)
ANION GAP SERPL CALCULATED.3IONS-SCNC: 7 MMOL/L (ref 7–16)
AST SERPL-CCNC: 14 U/L (ref 0–39)
BASOPHILS # BLD: 0.01 E9/L (ref 0–0.2)
BASOPHILS NFR BLD: 0.1 % (ref 0–2)
BILIRUB SERPL-MCNC: 0.8 MG/DL (ref 0–1.2)
BUN SERPL-MCNC: 23 MG/DL (ref 6–23)
CALCIUM SERPL-MCNC: 8.6 MG/DL (ref 8.6–10.2)
CHLORIDE SERPL-SCNC: 104 MMOL/L (ref 98–107)
CO2 SERPL-SCNC: 32 MMOL/L (ref 22–29)
CREAT SERPL-MCNC: 0.9 MG/DL (ref 0.7–1.2)
EOSINOPHIL # BLD: 0.21 E9/L (ref 0.05–0.5)
EOSINOPHIL NFR BLD: 2.2 % (ref 0–6)
ERYTHROCYTE [DISTWIDTH] IN BLOOD BY AUTOMATED COUNT: 13.5 FL (ref 11.5–15)
GLUCOSE SERPL-MCNC: 114 MG/DL (ref 74–99)
HCT VFR BLD AUTO: 42.1 % (ref 37–54)
HGB BLD-MCNC: 13.9 G/DL (ref 12.5–16.5)
IMM GRANULOCYTES # BLD: 0.08 E9/L
IMM GRANULOCYTES NFR BLD: 0.8 % (ref 0–5)
INR BLD: 1.1
LYMPHOCYTES # BLD: 1.11 E9/L (ref 1.5–4)
LYMPHOCYTES NFR BLD: 11.5 % (ref 20–42)
MAGNESIUM SERPL-MCNC: 2.1 MG/DL (ref 1.6–2.6)
MCH RBC QN AUTO: 29.2 PG (ref 26–35)
MCHC RBC AUTO-ENTMCNC: 33 % (ref 32–34.5)
MCV RBC AUTO: 88.4 FL (ref 80–99.9)
METER GLUCOSE: 86 MG/DL (ref 74–99)
METER GLUCOSE: 87 MG/DL (ref 74–99)
METER GLUCOSE: 90 MG/DL (ref 74–99)
METER GLUCOSE: 91 MG/DL (ref 74–99)
MONOCYTES # BLD: 1.2 E9/L (ref 0.1–0.95)
MONOCYTES NFR BLD: 12.5 % (ref 2–12)
NEUTROPHILS # BLD: 7.02 E9/L (ref 1.8–7.3)
NEUTS SEG NFR BLD: 72.9 % (ref 43–80)
PHOSPHATE SERPL-MCNC: 4.3 MG/DL (ref 2.5–4.5)
PLATELET # BLD AUTO: 227 E9/L (ref 130–450)
PMV BLD AUTO: 9.7 FL (ref 7–12)
POTASSIUM SERPL-SCNC: 3.9 MMOL/L (ref 3.5–5)
PROT SERPL-MCNC: 5.6 G/DL (ref 6.4–8.3)
PROTHROMBIN TIME: 12.5 SEC (ref 9.3–12.4)
PSA SERPL-MCNC: 2.85 NG/ML (ref 0–4)
RBC # BLD AUTO: 4.76 E12/L (ref 3.8–5.8)
SODIUM SERPL-SCNC: 143 MMOL/L (ref 132–146)
WBC # BLD: 9.6 E9/L (ref 4.5–11.5)

## 2023-06-20 PROCEDURE — 83735 ASSAY OF MAGNESIUM: CPT

## 2023-06-20 PROCEDURE — 85520 HEPARIN ASSAY: CPT

## 2023-06-20 PROCEDURE — 6370000000 HC RX 637 (ALT 250 FOR IP): Performed by: INTERNAL MEDICINE

## 2023-06-20 PROCEDURE — 6360000002 HC RX W HCPCS: Performed by: STUDENT IN AN ORGANIZED HEALTH CARE EDUCATION/TRAINING PROGRAM

## 2023-06-20 PROCEDURE — 82962 GLUCOSE BLOOD TEST: CPT

## 2023-06-20 PROCEDURE — 2580000003 HC RX 258: Performed by: INTERNAL MEDICINE

## 2023-06-20 PROCEDURE — 94660 CPAP INITIATION&MGMT: CPT

## 2023-06-20 PROCEDURE — G0103 PSA SCREENING: HCPCS

## 2023-06-20 PROCEDURE — 85610 PROTHROMBIN TIME: CPT

## 2023-06-20 PROCEDURE — 6360000002 HC RX W HCPCS: Performed by: INTERNAL MEDICINE

## 2023-06-20 PROCEDURE — 85025 COMPLETE CBC W/AUTO DIFF WBC: CPT

## 2023-06-20 PROCEDURE — 80053 COMPREHEN METABOLIC PANEL: CPT

## 2023-06-20 PROCEDURE — 84100 ASSAY OF PHOSPHORUS: CPT

## 2023-06-20 PROCEDURE — 99232 SBSQ HOSP IP/OBS MODERATE 35: CPT | Performed by: STUDENT IN AN ORGANIZED HEALTH CARE EDUCATION/TRAINING PROGRAM

## 2023-06-20 PROCEDURE — 2060000000 HC ICU INTERMEDIATE R&B

## 2023-06-20 PROCEDURE — 36415 COLL VENOUS BLD VENIPUNCTURE: CPT

## 2023-06-20 PROCEDURE — 6370000000 HC RX 637 (ALT 250 FOR IP): Performed by: UROLOGY

## 2023-06-20 RX ORDER — DOXAZOSIN MESYLATE 4 MG/1
4 TABLET ORAL DAILY
Status: DISCONTINUED | OUTPATIENT
Start: 2023-06-20 | End: 2023-06-21 | Stop reason: HOSPADM

## 2023-06-20 RX ADMIN — ENOXAPARIN SODIUM 100 MG: 100 INJECTION SUBCUTANEOUS at 08:54

## 2023-06-20 RX ADMIN — POLYMYXIN B SULFATE, BACITRACIN ZINC, NEOMYCIN SULFATE: 5000; 3.5; 4 OINTMENT TOPICAL at 08:54

## 2023-06-20 RX ADMIN — CALCIUM CARBONATE-VITAMIN D TAB 500 MG-200 UNIT 2 TABLET: 500-200 TAB at 20:52

## 2023-06-20 RX ADMIN — LEVOTHYROXINE SODIUM 200 MCG: 100 TABLET ORAL at 06:15

## 2023-06-20 RX ADMIN — LISINOPRIL 5 MG: 5 TABLET ORAL at 08:55

## 2023-06-20 RX ADMIN — ATORVASTATIN CALCIUM 80 MG: 40 TABLET, FILM COATED ORAL at 20:52

## 2023-06-20 RX ADMIN — AMPICILLIN SODIUM AND SULBACTAM SODIUM 3000 MG: 2; 1 INJECTION, POWDER, FOR SOLUTION INTRAMUSCULAR; INTRAVENOUS at 10:02

## 2023-06-20 RX ADMIN — Medication 10 ML: at 21:04

## 2023-06-20 RX ADMIN — DOXAZOSIN 4 MG: 4 TABLET ORAL at 13:30

## 2023-06-20 RX ADMIN — ENOXAPARIN SODIUM 100 MG: 100 INJECTION SUBCUTANEOUS at 20:52

## 2023-06-20 RX ADMIN — ACETAMINOPHEN 650 MG: 325 TABLET ORAL at 08:55

## 2023-06-20 RX ADMIN — CALCIUM CARBONATE-VITAMIN D TAB 500 MG-200 UNIT 2 TABLET: 500-200 TAB at 08:55

## 2023-06-20 RX ADMIN — ACETAMINOPHEN 650 MG: 325 TABLET ORAL at 20:52

## 2023-06-20 RX ADMIN — AMPICILLIN SODIUM AND SULBACTAM SODIUM 3000 MG: 2; 1 INJECTION, POWDER, FOR SOLUTION INTRAMUSCULAR; INTRAVENOUS at 16:19

## 2023-06-20 RX ADMIN — PANTOPRAZOLE SODIUM 40 MG: 40 TABLET, DELAYED RELEASE ORAL at 06:15

## 2023-06-20 RX ADMIN — CITALOPRAM HYDROBROMIDE 20 MG: 20 TABLET ORAL at 20:52

## 2023-06-20 RX ADMIN — Medication 10 ML: at 08:55

## 2023-06-20 RX ADMIN — AMPICILLIN SODIUM AND SULBACTAM SODIUM 3000 MG: 2; 1 INJECTION, POWDER, FOR SOLUTION INTRAMUSCULAR; INTRAVENOUS at 21:01

## 2023-06-20 RX ADMIN — AMPICILLIN SODIUM AND SULBACTAM SODIUM 3000 MG: 2; 1 INJECTION, POWDER, FOR SOLUTION INTRAMUSCULAR; INTRAVENOUS at 04:33

## 2023-06-20 NOTE — PROGRESS NOTES
[facility]  SEB 6W MED SURG  [address]  [place] [address]  Dept: [phone]  Loc: [phone]    Inpatient Medical Oncology/Hematology Progress Note    Patient Name: Alyse Ramos  YOB: 1955    DATE OF ADMISSION: 6/17/2023  DATE OF CONSULTATION: 6/17/2023  CONSULTING PROVIDER: Ynes Grimes DO  REASON FOR CONSULTATION: \"history of antiphospholipid syndrome s/p post op neck hematoma\"  PCP: Rosalinda Taylor    Room: 0615/0615-A      CHIEF COMPLAINT:  Neck swelling and shortness of breath    Subjective:  No major events overnight. Denies of new or alarming symptoms. It was reported about 30cc from [address] Upon my encounter today, patient had contents of LYUDMILA drained out earlier this morning but dark serosanguinous fluid noted in bulb. HPI from Initial Inpatient Consultation (06/17/23): Patient is a 78 y.o. male who presented with neck swelling and shortness of breath, was found to have a neck hematoma in the setting of recent total thyroidectomy. Patient had recently been found to have triple positive antiphospholipid syndrome. She does have previous history of history of DVT, approximately a year ago. She also reported having a stroke/TIA back around 2008 and an MI in 2016. In addition to being known to our service, she was seen by hematology at [facility], in which she was recommended to start Lovenox after his surgery, bridging to warfarin. Patient had taken only 1 dose of warfarin prior to admission. The patient reported that he initially tolerated surgery well without major issues. Surgery was performed on 6/12/2023. The patient then had anterior neck swelling around Thursday, which had worsened, and had increased shortness of breath, when flexing his neck downwards. He then presented to the hospital, in which a 12.2 x 4.8 cm hematoma was found. Earlier this morning, patient went back to the OR for hematoma evacuation.   I saw patient today in the ICU, in which vial 0-4 Units  0-4 Units SubCUTAneous Nightly Manuelito Hinds MD        oyster shell calcium w/D 500-5 MG-MCG tablet 2 tablet  2 tablet Oral BID Manuelito Hinds MD   2 tablet at 06/20/23 5543    neomycin-bacitracin-polymyxin (NEOSPORIN) ointment   Topical Daily Manuelito Hinds MD   Given at 06/20/23 9315       ALLERGIES:   Allergies   Allergen Reactions    No Known Allergies         SOCIAL HISTORY:   Social History     Socioeconomic History    Marital status:      Spouse name: None    Number of children: None    Years of education: None    Highest education level: None   Tobacco Use    Smoking status: Former     Packs/day: 1.50     Years: 37.00     Pack years: 55.50     Types: Cigarettes     Start date: 27     Quit date: 2008     Years since quitting: 15.4    Smokeless tobacco: Never    Tobacco comments:     Patient quit smoking 14 yrs.ago. Vaping Use    Vaping Use: Never used    Passive vaping exposure: Yes   Substance and Sexual Activity    Alcohol use: Not Currently    Drug use: Not Currently     Types: Marijuana [place]), Cocaine     Comment: past use; none since 1990    Sexual activity: Not Currently     Partners: Female   Social History Narrative    Drinks occ cup of coffee. Usually drinks all decaf products. FAMILY HISTORY:  Family History   Problem Relation Age of Onset    Dementia Mother     Cancer Father         pancreatic    No Known Problems Sister     No Known Problems Brother     No Known Problems Brother     Other Daughter         leukemia       PHYSICAL EXAM:   VITALS:  BP (!) 126/57   Pulse 78   Temp 98.2 °F (36.8 °C) (Oral)   Resp 18   Ht 6' (1.829 m)   Wt (!) 305 lb 4.8 oz (138.5 kg)   SpO2 92%   BMI 41.41 kg/m²   Physical Exam  Constitutional:       General: He is not in acute distress. Appearance: He is not ill-appearing, toxic-appearing or diaphoretic. HENT:      Head: Normocephalic.       Nose: Nose normal.      Mouth/Throat:      Mouth: Mucous membranes are

## 2023-06-20 NOTE — PROGRESS NOTES
OTOLARYNGOLOGY  DAILY PROGRESS NOTE  2023    Subjective:     Doing well. Neck is sore. Gonzalez placed after straight cath x 1 and gonzalez was later placed. Patient reports chest pain with Flomax use in the past. 10 cc off SS output from LYUDMILA overnight. Objective:     BP (!) 142/72   Pulse 59   Temp 98.3 °F (36.8 °C) (Oral)   Resp 18   Ht 6' (1.829 m)   Wt (!) 305 lb 4.8 oz (138.5 kg)   SpO2 93%   BMI 41.41 kg/m²   PULSE OXIMETRY RANGE: SpO2  Av.8 %  Min: 90 %  Max: 94 %  I/O last 3 completed shifts: In: 480 [P.O.:480]  Out: 3535 [Urine:3450; Drains:85]    GENERAL:  Awake, alert, cooperative, no apparent distress  HENT: Diffuse ecchymosis, soft. Drain with sanguineous output. EYES: No sclera icterus, pupils equal  LUNGS:  No increased work of breathing, no stridor  CARDIOVASCULAR:  RR      Assessment/Plan:     78 y.o. male with h/o of total thyroidectomy  with Dr. Berlin Nieves s/p evacuation of neck hematoma      - Continued urinary retention with gonzalez catheter in place, Urology consulted as patient has prior intolerance to Flomax  - Lovenox started  per Dr. Flavio Caballero  - Continue drain for today as Lovenox was just re-started  - anticipate discharge later today vs      Patient seen and examined by resident, discussed with attending on call.       Electronically signed by Jad Barry DO on 2023 at 6:49 AM

## 2023-06-20 NOTE — CONSULTS
[address] [address]                                  CONSULTATION    PATIENT NAME: Miladys Trejo                 :        1955  MED REC NO:   [id number]                            ROOM:       0615  ACCOUNT NO:   [de-identified]                           ADMIT DATE: 2023  PROVIDER:     Emily Fenton MD    CONSULT DATE:  2023    CHIEF COMPLAINT:  Difficulty voiding. HISTORY OF PRESENT ILLNESS:  This 81-year-old male who underwent a  thyroidectomy on the , presented to the emergency room with  neck swelling on  and was brought to the operating room for  evacuation of a hematoma. The patient became unable to void except in  very small frequent amounts, was found to have a distended bladder. A  Dorman catheter was put in place and the patient is now comfortable with  the Dorman catheter in place. The patient has been known to have  prostatic issues in the past.  Apparently his father had prostate  cancer. His PSA generally runs in the range of 2. The patient has  noticed that his stream has been slowing and he had been tried on  Flomax; however, he believes it caused him chest pain and he  discontinued the medication. PAST MEDICAL AND SURGICAL HISTORY:  Includes coronary artery disease,  status post angioplasty and stent placement, knee surgery bilaterally,  and thyroidectomy. MEDICATIONS:  Prior to admission:  Ascorbic acid, aspirin, atorvastatin,  Lipitor, Os-Chance, citalopram, Lovenox, hydrocodone, Norco, levothyroxine,  lisinopril, metformin, Glucophage, Tapazole, metoprolol, nitroglycerin,  omeprazole, and Zofran. ALLERGIES:  None known to medication. FAMILY HISTORY:  Father had prostate cancer. SOCIAL HISTORY:  The patient has not been a cigarette smoker for 14  years. PHYSICAL EXAMINATION:  The patient is alert and oriented, quite obese.    He has

## 2023-06-20 NOTE — PROGRESS NOTES
Rogelio Dickens M.D.,[facility]  Elías Boyd D.O., F.A.C.O.I., Madison Malloy M.D. Amrit Rodrigez M.D. Tiffany Trejo D.O. Daily Pulmonary Progress Note    Patient:  Pancho Dia 78 y.o. male MRN: [id number]     Date of Service: 6/20/2023      Synopsis     We are following patient for ANGEL    \"CC\" neck swelling, dyspnea    Code status: FULL      Subjective      Patient was seen and examined. Feeling ok with breathing. Neck edema improved. +ecchymosis. LYUDMILA neck drain with serosanguinous output 30 cc recorded yesterday. Not using CPAP, states it makes his neck sore. He will use his own machine at home, which he states has humidity when he is discharged. No cough or hemoptysis. Not requiring supplemental oxygen. Review of Systems:  Constitutional: Denies fever, weight loss, night sweats, and fatigue  Skin: Denies pigmentation, dark lesions, and rashes   HEENT: Denies hearing loss, tinnitus, ear drainage, epistaxis, and hoarseness. neck sore, healing hematoma, sore throat  Cardiovascular: Denies palpitations, chest pain, and chest pressure. Respiratory: Denies cough, dyspnea at rest, hemoptysis, apnea, and choking. mild dyspnea improved  Gastrointestinal: Denies nausea, vomiting, poor appetite, diarrhea, heartburn or reflux  Genitourinary: Denies dysuria, frequency, urgency or hematuria  Musculoskeletal: Denies myalgias, muscle weakness, and bone pain  Neurological: Denies dizziness, vertigo, headache, and focal weakness  Psychological: Denies anxiety and depression  Endocrine: Denies heat intolerance and cold intolerance  Hematopoietic/Lymphatic: Denies bleeding problems and blood transfusions    24-hour events:  Out of icu    Objective   Vitals: BP (!) 146/81   Pulse 69   Temp 98.3 °F (36.8 °C) (Oral)   Resp 18   Ht 6' (1.829 m)   Wt (!) 305 lb 4.8 oz (138.5 kg)   SpO2 94%   BMI 41.41 kg/m²     I/O:    Intake/Output Summary (Last 24 hours) at 6/20/2023 1143  Last data filed at

## 2023-06-20 NOTE — PROGRESS NOTES
Physician Progress Note      PATIENTPhil Chilo  CSN #:                  [id number]  :                       1955  ADMIT DATE:       2023 4:22 AM  DISCH DATE:  RESPONDING  PROVIDER #:        CUAUHTEMOC PICKESN          QUERY TEXT:    Dear ENT Attending,    Pt admitted with post operative anterior neck hematoma. Recently underwent   thyroidectomy 2023.? If possible, please document in progress notes and   discharge summary the relationship if any between the anterior neck hematoma   and the surgery: The medical record reflects the following:  Risk Factors: Recent thyroidectomy 2023. Zebedee Martyr Zebedee Martyr Post surgical hematoma of the   neck, history of antiphospholipid syndrome and resumed lovenox 24 hours after   surgery and started on his coumadin one day ago  Clinical Indicators: Per H/P,\". .. post total thyroidectomy neck hematoma. PLAN: Protamine for lovenox reversal Decadron 10mg  STAT OR for evacuation of   neck hematoma. Meddeborah Jacquelyn .. \"  Treatment: anterior neck hematoma-s/p evacuation 23, Protamine for   lovenox reversal Decadron    Thank you,  Raven Gilliam RN [facility]  Clinical Documentation Improvement Specialist  Options provided:  -- Anterior neck hematoma is due to the procedure  -- Anterior neck hematoma is not due to the procedure, but is due to other   incidental risk factor, Please specify other incidental risk factor. -- Other - I will add my own diagnosis  -- Disagree - Not applicable / Not valid  -- Disagree - Clinically unable to determine / Unknown  -- Refer to Clinical Documentation Reviewer    PROVIDER RESPONSE TEXT:    Patient has anterior neck hematoma due to the procedure.     Query created by: Yvonne Hernandez on 2023 3:05 PM      Electronically signed by:  Joanna Tomlinson 2023 3:41 PM

## 2023-06-21 VITALS
BODY MASS INDEX: 40.89 KG/M2 | RESPIRATION RATE: 18 BRPM | SYSTOLIC BLOOD PRESSURE: 138 MMHG | HEART RATE: 87 BPM | OXYGEN SATURATION: 93 % | DIASTOLIC BLOOD PRESSURE: 64 MMHG | WEIGHT: 301.9 LBS | TEMPERATURE: 98 F | HEIGHT: 72 IN

## 2023-06-21 LAB
ALBUMIN SERPL-MCNC: 3.1 G/DL (ref 3.5–5.2)
ALP SERPL-CCNC: 89 U/L (ref 40–129)
ALT SERPL-CCNC: 29 U/L (ref 0–40)
ANION GAP SERPL CALCULATED.3IONS-SCNC: 9 MMOL/L (ref 7–16)
AST SERPL-CCNC: 14 U/L (ref 0–39)
BASOPHILS # BLD: 0.01 E9/L (ref 0–0.2)
BASOPHILS NFR BLD: 0.1 % (ref 0–2)
BILIRUB SERPL-MCNC: 1.3 MG/DL (ref 0–1.2)
BUN SERPL-MCNC: 18 MG/DL (ref 6–23)
CALCIUM SERPL-MCNC: 8.4 MG/DL (ref 8.6–10.2)
CHLORIDE SERPL-SCNC: 102 MMOL/L (ref 98–107)
CO2 SERPL-SCNC: 30 MMOL/L (ref 22–29)
CREAT SERPL-MCNC: 0.7 MG/DL (ref 0.7–1.2)
EOSINOPHIL # BLD: 0.21 E9/L (ref 0.05–0.5)
EOSINOPHIL NFR BLD: 2.7 % (ref 0–6)
ERYTHROCYTE [DISTWIDTH] IN BLOOD BY AUTOMATED COUNT: 13.5 FL (ref 11.5–15)
GLUCOSE SERPL-MCNC: 104 MG/DL (ref 74–99)
HCT VFR BLD AUTO: 41.6 % (ref 37–54)
HGB BLD-MCNC: 13.9 G/DL (ref 12.5–16.5)
IMM GRANULOCYTES # BLD: 0.07 E9/L
IMM GRANULOCYTES NFR BLD: 0.9 % (ref 0–5)
LYMPHOCYTES # BLD: 1.17 E9/L (ref 1.5–4)
LYMPHOCYTES NFR BLD: 15.3 % (ref 20–42)
MAGNESIUM SERPL-MCNC: 2.1 MG/DL (ref 1.6–2.6)
MCH RBC QN AUTO: 29.7 PG (ref 26–35)
MCHC RBC AUTO-ENTMCNC: 33.4 % (ref 32–34.5)
MCV RBC AUTO: 88.9 FL (ref 80–99.9)
METER GLUCOSE: 108 MG/DL (ref 74–99)
METER GLUCOSE: 140 MG/DL (ref 74–99)
MONOCYTES # BLD: 0.73 E9/L (ref 0.1–0.95)
MONOCYTES NFR BLD: 9.5 % (ref 2–12)
NEUTROPHILS # BLD: 5.48 E9/L (ref 1.8–7.3)
NEUTS SEG NFR BLD: 71.5 % (ref 43–80)
PHOSPHATE SERPL-MCNC: 3.3 MG/DL (ref 2.5–4.5)
PLATELET # BLD AUTO: 195 E9/L (ref 130–450)
PMV BLD AUTO: 10 FL (ref 7–12)
POTASSIUM SERPL-SCNC: 3.9 MMOL/L (ref 3.5–5)
PROT SERPL-MCNC: 5.6 G/DL (ref 6.4–8.3)
RBC # BLD AUTO: 4.68 E12/L (ref 3.8–5.8)
SODIUM SERPL-SCNC: 141 MMOL/L (ref 132–146)
WBC # BLD: 7.7 E9/L (ref 4.5–11.5)

## 2023-06-21 PROCEDURE — 6360000002 HC RX W HCPCS: Performed by: INTERNAL MEDICINE

## 2023-06-21 PROCEDURE — 80053 COMPREHEN METABOLIC PANEL: CPT

## 2023-06-21 PROCEDURE — 84100 ASSAY OF PHOSPHORUS: CPT

## 2023-06-21 PROCEDURE — 6370000000 HC RX 637 (ALT 250 FOR IP): Performed by: INTERNAL MEDICINE

## 2023-06-21 PROCEDURE — 83735 ASSAY OF MAGNESIUM: CPT

## 2023-06-21 PROCEDURE — 2580000003 HC RX 258: Performed by: INTERNAL MEDICINE

## 2023-06-21 PROCEDURE — 82962 GLUCOSE BLOOD TEST: CPT

## 2023-06-21 PROCEDURE — 6370000000 HC RX 637 (ALT 250 FOR IP): Performed by: UROLOGY

## 2023-06-21 PROCEDURE — 6360000002 HC RX W HCPCS: Performed by: STUDENT IN AN ORGANIZED HEALTH CARE EDUCATION/TRAINING PROGRAM

## 2023-06-21 PROCEDURE — 94660 CPAP INITIATION&MGMT: CPT

## 2023-06-21 PROCEDURE — 85025 COMPLETE CBC W/AUTO DIFF WBC: CPT

## 2023-06-21 PROCEDURE — 36415 COLL VENOUS BLD VENIPUNCTURE: CPT

## 2023-06-21 RX ORDER — ONDANSETRON 4 MG/1
4 TABLET, FILM COATED ORAL EVERY 6 HOURS
Qty: 20 TABLET | Refills: 0 | Status: SHIPPED | OUTPATIENT
Start: 2023-06-21 | End: 2023-06-26

## 2023-06-21 RX ORDER — HYDROCODONE BITARTRATE AND ACETAMINOPHEN 7.5; 325 MG/1; MG/1
1 TABLET ORAL EVERY 6 HOURS PRN
Qty: 20 TABLET | Refills: 0 | Status: SHIPPED | OUTPATIENT
Start: 2023-06-21 | End: 2023-06-26

## 2023-06-21 RX ORDER — DOXAZOSIN MESYLATE 4 MG/1
4 TABLET ORAL DAILY
Qty: 30 TABLET | Refills: 3 | Status: SHIPPED | OUTPATIENT
Start: 2023-06-21

## 2023-06-21 RX ADMIN — LEVOTHYROXINE SODIUM 200 MCG: 100 TABLET ORAL at 06:11

## 2023-06-21 RX ADMIN — DOXAZOSIN 4 MG: 4 TABLET ORAL at 10:02

## 2023-06-21 RX ADMIN — PANTOPRAZOLE SODIUM 40 MG: 40 TABLET, DELAYED RELEASE ORAL at 06:11

## 2023-06-21 RX ADMIN — AMPICILLIN SODIUM AND SULBACTAM SODIUM 3000 MG: 2; 1 INJECTION, POWDER, FOR SOLUTION INTRAMUSCULAR; INTRAVENOUS at 06:01

## 2023-06-21 RX ADMIN — CALCIUM CARBONATE-VITAMIN D TAB 500 MG-200 UNIT 2 TABLET: 500-200 TAB at 10:12

## 2023-06-21 RX ADMIN — AMPICILLIN SODIUM AND SULBACTAM SODIUM 3000 MG: 2; 1 INJECTION, POWDER, FOR SOLUTION INTRAMUSCULAR; INTRAVENOUS at 10:17

## 2023-06-21 RX ADMIN — ENOXAPARIN SODIUM 100 MG: 100 INJECTION SUBCUTANEOUS at 10:01

## 2023-06-21 RX ADMIN — Medication 10 ML: at 10:03

## 2023-06-21 RX ADMIN — ACETAMINOPHEN 650 MG: 325 TABLET ORAL at 06:03

## 2023-06-21 RX ADMIN — POLYMYXIN B SULFATE, BACITRACIN ZINC, NEOMYCIN SULFATE: 5000; 3.5; 4 OINTMENT TOPICAL at 10:02

## 2023-06-21 RX ADMIN — LISINOPRIL 5 MG: 5 TABLET ORAL at 10:02

## 2023-06-21 ASSESSMENT — PAIN SCALES - GENERAL
PAINLEVEL_OUTOF10: 4
PAINLEVEL_OUTOF10: 0

## 2023-06-21 ASSESSMENT — PAIN DESCRIPTION - DESCRIPTORS: DESCRIPTORS: ACHING;DULL;DISCOMFORT

## 2023-06-21 ASSESSMENT — PAIN DESCRIPTION - LOCATION: LOCATION: NECK

## 2023-06-21 ASSESSMENT — PAIN DESCRIPTION - ORIENTATION: ORIENTATION: MID

## 2023-06-21 NOTE — PROGRESS NOTES
Spoke with Heme/Onc who would like patient to hold Lovenox for 24 hours given increased drain output. He can restart Lovenox tomorrow afternoon and he will undergo bridging to coumadin on an outpatient basis. Urology is okay with outpatient follow up for voiding trial and he is to continue the Cardura. ENT will plan for drain removal in the office this Friday. Will place DC order. If nursing can please instruct patient on proper drain care and how to measure daily outputs that would be appreciated.      Electronically signed by Jad Beckett DO on 6/21/2023 at 10:45 AM

## 2023-06-21 NOTE — CARE COORDINATION
Social work / Discharge Planning:       Patient accepted by [facility]. for start of care tomorrow. Charge nurse updated catheter and drain supplies need to be sent home with  patient.     Electronically signed by CARMEN Chavez on 6/21/2023 at 11:57 AM

## 2023-06-21 NOTE — DISCHARGE SUMMARY
CARDURA  Take 1 tablet by mouth daily            CONTINUE taking these medications      JOBST KNEE HIGH COMPRESSION SM Misc  Compression stockings 15 to 20 mm knee-high bilateral  Dx : Venous Insufficiency, Swelling            STOP taking these medications      FIBER PO     methIMAzole 10 MG tablet  Commonly known as: TAPAZOLE     vitamin C 500 MG tablet  Commonly known as: ASCORBIC ACID            ASK your doctor about these medications      aspirin 81 MG chewable tablet     atorvastatin 80 MG tablet  Commonly known as: LIPITOR  Take 1 tablet by mouth nightly     citalopram 20 MG tablet  Commonly known as: CELEXA     enoxaparin 300 MG/3ML injection  Commonly known as: LOVENOX     Fish Oil 600 MG Caps     HYDROcodone-acetaminophen 7.5-325 MG per tablet  Commonly known as: Norco  Take 1 tablet by mouth every 6 hours as needed for Pain for up to 5 days. Intended supply: 5 days. Take lowest dose possible to manage pain Max Daily Amount: 4 tablets  Ask about: Should I take this medication?     levothyroxine 200 MCG tablet  Commonly known as: SYNTHROID  Take 1 tablet by mouth daily     lisinopril 5 MG tablet  Commonly known as: PRINIVIL;ZESTRIL  One tablet daily     metFORMIN 500 MG extended release tablet  Commonly known as: GLUCOPHAGE-XR     metoprolol succinate 25 MG extended release tablet  Commonly known as: TOPROL XL  Take 1 tablet by mouth daily     nitroGLYCERIN 0.4 MG SL tablet  Commonly known as: NITROSTAT  Dissolve 1 tablet under tongue for chest pain and repeat every 5 min up to max of 3 total doses. If no relief after 3 doses call 911     omeprazole 20 MG delayed release capsule  Commonly known as: PRILOSEC     ondansetron 4 MG tablet  Commonly known as: Zofran  Take 1 tablet by mouth in the morning and 1 tablet at noon and 1 tablet in the evening and 1 tablet before bedtime. Do all this for 5 days. Ask about: Should I take this medication?      Oscal 500 D-3 500-5 MG-MCG Tabs per tablet  Generic drug:

## 2023-06-21 NOTE — PROGRESS NOTES
OTOLARYNGOLOGY  DAILY PROGRESS NOTE  2023    Subjective:     No acute events overnight. Per nursing staff, increased output from neck drain approximately 50 cc SS. Objective:     /76   Pulse 96   Temp 97.7 °F (36.5 °C) (Oral)   Resp 18   Ht 6' (1.829 m)   Wt (!) 301 lb 14.4 oz (136.9 kg)   SpO2 93%   BMI 40.95 kg/m²   PULSE OXIMETRY RANGE: SpO2  Av.3 %  Min: 92 %  Max: 95 %  I/O last 3 completed shifts: In: 12 [P.O.:460; IV Piggyback:100]  Out: 2820 [Urine:2780; Drains:40]    GENERAL:  Awake, alert, cooperative, no apparent distress  HENT: Diffuse ecchymosis, soft. Drain with 50 cc sanguineous output since yesterday  EYES: No sclera icterus, pupils equal  LUNGS:  No increased work of breathing, no stridor  CARDIOVASCULAR:  RR      Assessment/Plan:     78 y.o. male with h/o of total thyroidectomy  with Dr. Ilsa [place] s/p evacuation of neck hematoma      - Continued urinary retention with gonzalez catheter in place, Urology consulted as patient has prior intolerance to Flomax. Per Urology, pt has to remain on Cardura for at least 3 days prior to voiding trial. Will coordinate with them for possible outpatient follow up on . - Lovenox started  per Dr. Renato Dobbs. Will coordinate with Heme/Onc on bridging to coumadin on an outpatient basis. - Continue drain for today given increased output overnight.  If DC to home today, will plan to remove drain in clinic this .   - anticipate discharge later today pending input from other consulted services    Patient seen and examined by resident, discussed with Dr. Ilsa [place]    Electronically signed by Shari Gorman DO on 2023 at 8:21 AM

## 2023-06-21 NOTE — PROGRESS NOTES
CLINICAL PHARMACY NOTE: MEDS TO BEDS    Total # of Prescriptions Filled: 1   The following medications were delivered to the patient:  Doxazosin 4    Additional Documentation:

## 2023-06-21 NOTE — PROGRESS NOTES
Outpatient pharmacy profiled patients norco 7.5 and ondansetron , patient states he does not need them .

## 2023-06-21 NOTE — PROGRESS NOTES
6/21/2023 9:37 AM  Santos Mack  [id number]    Subjective:     Tolerating the Dorman catheter  Draining yellow urine  He is pending discharge today    Review of Systems  Constitutional: No fever or chills   Respiratory: negative for cough and hemoptysis  Cardiovascular: negative for chest pain and dyspnea  Gastrointestinal: negative for abdominal pain, diarrhea, nausea and vomiting   : See above  Derm: negative for rash and skin lesion(s)  Neurological: negative for seizures and tremors  Musculoskeletal: Negative    Psychiatric: Negative   All other reviews are negative      Scheduled Meds:   doxazosin  4 mg Oral Daily    enoxaparin  100 mg SubCUTAneous BID    sodium chloride flush  5-40 mL IntraVENous 2 times per day    acetaminophen  650 mg Oral Q6H    ampicillin-sulbactam  3,000 mg IntraVENous Q6H    atorvastatin  80 mg Oral Nightly    citalopram  20 mg Oral Nightly    pantoprazole  40 mg Oral QAM AC    metoprolol succinate  25 mg Oral QPM    levothyroxine  200 mcg Oral Daily    lisinopril  5 mg Oral Daily    insulin lispro  0-4 Units SubCUTAneous TID WC    insulin lispro  0-4 Units SubCUTAneous Nightly    oyster shell calcium w/D  2 tablet Oral BID    neomycin-bacitracin-polymyxin   Topical Daily       Objective:  Vitals:    06/21/23 0737   BP: 139/76   Pulse: 96   Resp: 18   Temp: 97.7 °F (36.5 °C)   SpO2: 93%         Allergies: No known allergies    General Appearance: alert and oriented to person, place and time and in no acute distress  Skin: no rash or erythema  Head: normocephalic and atraumatic  Pulmonary/Chest: normal air movement, no respiratory distress  Abdomen: soft, non-tender, non-distended  Genitourinary: Catheter draining yellow urine  Extremities: no cyanosis, clubbing or edema         Labs:     Recent Labs     06/21/23  0700      K 3.9      CO2 30*   BUN 18   CREATININE 0.7   GLUCOSE 104*   CALCIUM 8.4*       Lab Results   Component Value Date/Time    HGB 13.9 06/21/2023

## 2023-06-21 NOTE — CARE COORDINATION
Social work / Discharge Planning:          Discharge order noted. Patient will need home care for drain care and gonzalez catheter. Patient has no preference for home care agency. Referral made to [facility] [place].. Awaiting acceptance.    Electronically signed by CARMEN Chavez on 6/21/2023 at 11:02 AM

## 2023-06-22 LAB — HISTONE IGG SER IA-ACNC: 3.6 UNITS (ref 0–0.9)

## 2023-06-23 DIAGNOSIS — D68.61 ANTIPHOSPHOLIPID SYNDROME (HCC): Primary | ICD-10-CM

## 2023-06-23 LAB
UFH PPP CHRO-ACNC: 0.32 U/ML (ref 0.5–1.1)
UFH PPP CHRO-ACNC: 0.6 U/ML (ref 0.5–1.1)

## 2023-06-23 RX ORDER — ENOXAPARIN SODIUM 100 MG/ML
100 INJECTION SUBCUTANEOUS 2 TIMES DAILY
Qty: 20 ML | Refills: 0 | Status: SHIPPED | OUTPATIENT
Start: 2023-06-23 | End: 2023-07-03

## 2023-06-23 NOTE — PROGRESS NOTES
Labs reviewed, notably the Anti-Xa level. Level is WNL, in which patient had been taking 100 mg of lovenox. The Anti-Xa was drawn about 4 hours after Lovenox dose while in the hospital.    I called patient to check up on him. Will write new script for Lovenox 100 mg BID.

## 2023-06-27 ENCOUNTER — OFFICE VISIT (OUTPATIENT)
Dept: ENT CLINIC | Age: 68
End: 2023-06-27

## 2023-06-27 VITALS
HEART RATE: 78 BPM | DIASTOLIC BLOOD PRESSURE: 64 MMHG | WEIGHT: 301 LBS | SYSTOLIC BLOOD PRESSURE: 139 MMHG | BODY MASS INDEX: 40.77 KG/M2 | OXYGEN SATURATION: 91 % | HEIGHT: 72 IN

## 2023-06-27 DIAGNOSIS — S10.93XS HEMATOMA OF NECK, SEQUELA: ICD-10-CM

## 2023-06-27 DIAGNOSIS — E04.1 THYROID NODULE: Primary | ICD-10-CM

## 2023-06-27 DIAGNOSIS — E05.90 HYPERTHYROIDISM: ICD-10-CM

## 2023-06-27 PROCEDURE — 99024 POSTOP FOLLOW-UP VISIT: CPT | Performed by: OTOLARYNGOLOGY

## 2023-06-27 ASSESSMENT — ENCOUNTER SYMPTOMS
ABDOMINAL PAIN: 0
GASTROINTESTINAL NEGATIVE: 1
EYE DISCHARGE: 0
EYES NEGATIVE: 1
CHEST TIGHTNESS: 0
RESPIRATORY NEGATIVE: 1
VOMITING: 0
SHORTNESS OF BREATH: 0
COLOR CHANGE: 0
DIARRHEA: 0
APNEA: 0
EYE PAIN: 0
TROUBLE SWALLOWING: 0

## 2023-06-29 ENCOUNTER — OFFICE VISIT (OUTPATIENT)
Dept: ONCOLOGY | Age: 68
End: 2023-06-29
Payer: MEDICARE

## 2023-06-29 ENCOUNTER — HOSPITAL ENCOUNTER (OUTPATIENT)
Dept: INFUSION THERAPY | Age: 68
Discharge: HOME | End: 2023-06-29

## 2023-06-29 VITALS
BODY MASS INDEX: 39.8 KG/M2 | HEIGHT: 72 IN | SYSTOLIC BLOOD PRESSURE: 133 MMHG | HEART RATE: 78 BPM | OXYGEN SATURATION: 92 % | DIASTOLIC BLOOD PRESSURE: 77 MMHG | TEMPERATURE: 98.1 F | WEIGHT: 293.8 LBS

## 2023-06-29 DIAGNOSIS — S10.93XD HEMATOMA OF NECK, SUBSEQUENT ENCOUNTER: Primary | ICD-10-CM

## 2023-06-29 DIAGNOSIS — Z79.01 CHRONIC ANTICOAGULATION: ICD-10-CM

## 2023-06-29 DIAGNOSIS — D68.61 ANTIPHOSPHOLIPID SYNDROME (HCC): ICD-10-CM

## 2023-06-29 PROCEDURE — 3078F DIAST BP <80 MM HG: CPT | Performed by: STUDENT IN AN ORGANIZED HEALTH CARE EDUCATION/TRAINING PROGRAM

## 2023-06-29 PROCEDURE — G8417 CALC BMI ABV UP PARAM F/U: HCPCS | Performed by: STUDENT IN AN ORGANIZED HEALTH CARE EDUCATION/TRAINING PROGRAM

## 2023-06-29 PROCEDURE — 3017F COLORECTAL CA SCREEN DOC REV: CPT | Performed by: STUDENT IN AN ORGANIZED HEALTH CARE EDUCATION/TRAINING PROGRAM

## 2023-06-29 PROCEDURE — 1036F TOBACCO NON-USER: CPT | Performed by: STUDENT IN AN ORGANIZED HEALTH CARE EDUCATION/TRAINING PROGRAM

## 2023-06-29 PROCEDURE — 99212 OFFICE O/P EST SF 10 MIN: CPT

## 2023-06-29 PROCEDURE — G8427 DOCREV CUR MEDS BY ELIG CLIN: HCPCS | Performed by: STUDENT IN AN ORGANIZED HEALTH CARE EDUCATION/TRAINING PROGRAM

## 2023-06-29 PROCEDURE — 1123F ACP DISCUSS/DSCN MKR DOCD: CPT | Performed by: STUDENT IN AN ORGANIZED HEALTH CARE EDUCATION/TRAINING PROGRAM

## 2023-06-29 PROCEDURE — 1111F DSCHRG MED/CURRENT MED MERGE: CPT | Performed by: STUDENT IN AN ORGANIZED HEALTH CARE EDUCATION/TRAINING PROGRAM

## 2023-06-29 PROCEDURE — 99204 OFFICE O/P NEW MOD 45 MIN: CPT | Performed by: STUDENT IN AN ORGANIZED HEALTH CARE EDUCATION/TRAINING PROGRAM

## 2023-06-29 PROCEDURE — 3075F SYST BP GE 130 - 139MM HG: CPT | Performed by: STUDENT IN AN ORGANIZED HEALTH CARE EDUCATION/TRAINING PROGRAM

## 2023-06-29 ASSESSMENT — ENCOUNTER SYMPTOMS
SHORTNESS OF BREATH: 0
TROUBLE SWALLOWING: 0
GASTROINTESTINAL NEGATIVE: 1

## 2023-08-21 ENCOUNTER — OFFICE VISIT (OUTPATIENT)
Dept: CARDIOLOGY CLINIC | Age: 68
End: 2023-08-21
Payer: MEDICARE

## 2023-08-21 VITALS
RESPIRATION RATE: 16 BRPM | SYSTOLIC BLOOD PRESSURE: 110 MMHG | WEIGHT: 294.2 LBS | HEIGHT: 72 IN | DIASTOLIC BLOOD PRESSURE: 52 MMHG | OXYGEN SATURATION: 94 % | HEART RATE: 59 BPM | BODY MASS INDEX: 39.85 KG/M2

## 2023-08-21 DIAGNOSIS — Z98.890 H/O COLONOSCOPY WITH POLYPECTOMY: ICD-10-CM

## 2023-08-21 DIAGNOSIS — Z86.718 H/O DEEP VENOUS THROMBOSIS: ICD-10-CM

## 2023-08-21 DIAGNOSIS — Z98.61 HISTORY OF PTCA: ICD-10-CM

## 2023-08-21 DIAGNOSIS — G47.33 OSA ON CPAP: ICD-10-CM

## 2023-08-21 DIAGNOSIS — E66.01 MORBID OBESITY DUE TO EXCESS CALORIES (HCC): ICD-10-CM

## 2023-08-21 DIAGNOSIS — D68.61 ANTIPHOSPHOLIPID SYNDROME (HCC): ICD-10-CM

## 2023-08-21 DIAGNOSIS — M17.0 PRIMARY OSTEOARTHRITIS OF BOTH KNEES: ICD-10-CM

## 2023-08-21 DIAGNOSIS — I10 ESSENTIAL HYPERTENSION: ICD-10-CM

## 2023-08-21 DIAGNOSIS — N40.1 BENIGN PROSTATIC HYPERPLASIA WITH NOCTURIA: ICD-10-CM

## 2023-08-21 DIAGNOSIS — R35.1 BENIGN PROSTATIC HYPERPLASIA WITH NOCTURIA: ICD-10-CM

## 2023-08-21 DIAGNOSIS — I25.2 HISTORY OF ACUTE ANTERIOR WALL MI: ICD-10-CM

## 2023-08-21 DIAGNOSIS — I25.10 CORONARY ARTERY DISEASE INVOLVING NATIVE CORONARY ARTERY OF NATIVE HEART WITHOUT ANGINA PECTORIS: Primary | ICD-10-CM

## 2023-08-21 DIAGNOSIS — Z86.010 H/O COLONOSCOPY WITH POLYPECTOMY: ICD-10-CM

## 2023-08-21 DIAGNOSIS — E89.0 H/O TOTAL THYROIDECTOMY: ICD-10-CM

## 2023-08-21 DIAGNOSIS — Z99.89 OSA ON CPAP: ICD-10-CM

## 2023-08-21 DIAGNOSIS — E78.5 DYSLIPIDEMIA: ICD-10-CM

## 2023-08-21 DIAGNOSIS — Z86.73 H/O: CVA (CEREBROVASCULAR ACCIDENT): ICD-10-CM

## 2023-08-21 DIAGNOSIS — Z87.442 HISTORY OF KIDNEY STONES: ICD-10-CM

## 2023-08-21 PROCEDURE — 1123F ACP DISCUSS/DSCN MKR DOCD: CPT | Performed by: INTERNAL MEDICINE

## 2023-08-21 PROCEDURE — 3074F SYST BP LT 130 MM HG: CPT | Performed by: INTERNAL MEDICINE

## 2023-08-21 PROCEDURE — 1036F TOBACCO NON-USER: CPT | Performed by: INTERNAL MEDICINE

## 2023-08-21 PROCEDURE — 99214 OFFICE O/P EST MOD 30 MIN: CPT | Performed by: INTERNAL MEDICINE

## 2023-08-21 PROCEDURE — G8427 DOCREV CUR MEDS BY ELIG CLIN: HCPCS | Performed by: INTERNAL MEDICINE

## 2023-08-21 PROCEDURE — 3017F COLORECTAL CA SCREEN DOC REV: CPT | Performed by: INTERNAL MEDICINE

## 2023-08-21 PROCEDURE — 93000 ELECTROCARDIOGRAM COMPLETE: CPT | Performed by: INTERNAL MEDICINE

## 2023-08-21 PROCEDURE — 3078F DIAST BP <80 MM HG: CPT | Performed by: INTERNAL MEDICINE

## 2023-08-21 PROCEDURE — G8417 CALC BMI ABV UP PARAM F/U: HCPCS | Performed by: INTERNAL MEDICINE

## 2023-08-21 RX ORDER — NITROGLYCERIN 0.4 MG/1
TABLET SUBLINGUAL
Qty: 25 TABLET | Refills: 3 | Status: SHIPPED | OUTPATIENT
Start: 2023-08-21

## 2023-08-21 RX ORDER — ENOXAPARIN SODIUM 100 MG/ML
INJECTION SUBCUTANEOUS
COMMUNITY
Start: 2023-07-25

## 2023-08-21 RX ORDER — LISINOPRIL 5 MG/1
TABLET ORAL
Qty: 90 TABLET | Refills: 3 | Status: SHIPPED | OUTPATIENT
Start: 2023-08-21

## 2023-08-21 RX ORDER — METOPROLOL SUCCINATE 25 MG/1
25 TABLET, EXTENDED RELEASE ORAL EVERY EVENING
Qty: 90 TABLET | Refills: 3 | Status: SHIPPED | OUTPATIENT
Start: 2023-08-21

## 2023-08-29 ENCOUNTER — HOSPITAL ENCOUNTER (OUTPATIENT)
Dept: INFUSION THERAPY | Age: 68
Discharge: HOME | End: 2023-08-29

## 2023-08-29 ENCOUNTER — OFFICE VISIT (OUTPATIENT)
Dept: ONCOLOGY | Age: 68
End: 2023-08-29
Payer: MEDICARE

## 2023-08-29 VITALS
SYSTOLIC BLOOD PRESSURE: 127 MMHG | OXYGEN SATURATION: 93 % | TEMPERATURE: 96.9 F | DIASTOLIC BLOOD PRESSURE: 63 MMHG | HEART RATE: 90 BPM | BODY MASS INDEX: 39.71 KG/M2 | WEIGHT: 293.2 LBS | HEIGHT: 72 IN

## 2023-08-29 DIAGNOSIS — Z79.01 CHRONIC ANTICOAGULATION: ICD-10-CM

## 2023-08-29 DIAGNOSIS — D68.61 ANTIPHOSPHOLIPID SYNDROME (HCC): Primary | ICD-10-CM

## 2023-08-29 PROCEDURE — 1123F ACP DISCUSS/DSCN MKR DOCD: CPT | Performed by: STUDENT IN AN ORGANIZED HEALTH CARE EDUCATION/TRAINING PROGRAM

## 2023-08-29 PROCEDURE — 3074F SYST BP LT 130 MM HG: CPT | Performed by: STUDENT IN AN ORGANIZED HEALTH CARE EDUCATION/TRAINING PROGRAM

## 2023-08-29 PROCEDURE — G8417 CALC BMI ABV UP PARAM F/U: HCPCS | Performed by: STUDENT IN AN ORGANIZED HEALTH CARE EDUCATION/TRAINING PROGRAM

## 2023-08-29 PROCEDURE — G8427 DOCREV CUR MEDS BY ELIG CLIN: HCPCS | Performed by: STUDENT IN AN ORGANIZED HEALTH CARE EDUCATION/TRAINING PROGRAM

## 2023-08-29 PROCEDURE — 3078F DIAST BP <80 MM HG: CPT | Performed by: STUDENT IN AN ORGANIZED HEALTH CARE EDUCATION/TRAINING PROGRAM

## 2023-08-29 PROCEDURE — 3017F COLORECTAL CA SCREEN DOC REV: CPT | Performed by: STUDENT IN AN ORGANIZED HEALTH CARE EDUCATION/TRAINING PROGRAM

## 2023-08-29 PROCEDURE — 1036F TOBACCO NON-USER: CPT | Performed by: STUDENT IN AN ORGANIZED HEALTH CARE EDUCATION/TRAINING PROGRAM

## 2023-08-29 PROCEDURE — 99212 OFFICE O/P EST SF 10 MIN: CPT

## 2023-08-29 PROCEDURE — 99213 OFFICE O/P EST LOW 20 MIN: CPT | Performed by: STUDENT IN AN ORGANIZED HEALTH CARE EDUCATION/TRAINING PROGRAM

## 2023-08-29 RX ORDER — WARFARIN SODIUM 5 MG/1
5 TABLET ORAL
COMMUNITY

## 2023-08-29 ASSESSMENT — ENCOUNTER SYMPTOMS
SHORTNESS OF BREATH: 0
TROUBLE SWALLOWING: 0
GASTROINTESTINAL NEGATIVE: 1

## 2023-08-29 NOTE — PROGRESS NOTES
goal between 2-3  No overt signs of bleed  No future surgeries/procedures planned at this time      DISPO:   RTC in 1 year      Thank you for allowing us to participate in the care of Sean Mack       Approximately spent 28 minutes on chart review as well as time spent on patient encounter, discussing the laboratory, imaging, and clinical findings; and documentation. I have discussed clinical implications and recommendations on the patient's primary issues. More than 50% of time was spent counseling patient. The patient verbalized understanding.       Sameer E Jazmyne Keys  08/29/23 1:25 PM    [facility]) Office  P: [phone]  F: [phone]    [facility]) Office  P: [phone]  F: [phone]

## 2023-11-01 ENCOUNTER — TELEPHONE (OUTPATIENT)
Dept: ENT CLINIC | Age: 68
End: 2023-11-01

## 2023-11-01 NOTE — TELEPHONE ENCOUNTER
Spoke to Dr Miya Mcdonnell. He will send refill this time but would like PCP to send scripts from now on since patient was made PRN for our practice. Patient said he actually needs to get bloodwork from Dr Apryl Redd office and see if dosing needs adjusted. Patient will get labs and follow with Dr Carson Garcia for new script.

## 2023-11-01 NOTE — TELEPHONE ENCOUNTER
Pt called office. He needs a refill on his thyroid medication called into [facility] in [place].  Electronically signed by Natalia Gardner on 11/1/2023 at 1:54 PM

## 2023-11-01 NOTE — TELEPHONE ENCOUNTER
Dr Ssisy Gimenez notified and would like patient to follow up with Dr Estelle Renae for refills and dose adjustments.

## 2024-04-24 RX ORDER — LISINOPRIL 5 MG/1
TABLET ORAL
Qty: 90 TABLET | Refills: 0 | Status: SHIPPED | OUTPATIENT
Start: 2024-04-24

## 2024-07-22 RX ORDER — LISINOPRIL 5 MG/1
TABLET ORAL
Qty: 90 TABLET | Refills: 2 | Status: SHIPPED | OUTPATIENT
Start: 2024-07-22

## 2024-08-09 ENCOUNTER — OFFICE VISIT (OUTPATIENT)
Dept: CARDIOLOGY CLINIC | Age: 69
End: 2024-08-09
Payer: MEDICARE

## 2024-08-09 VITALS
RESPIRATION RATE: 18 BRPM | BODY MASS INDEX: 41.09 KG/M2 | DIASTOLIC BLOOD PRESSURE: 58 MMHG | WEIGHT: 303.4 LBS | HEIGHT: 72 IN | SYSTOLIC BLOOD PRESSURE: 102 MMHG

## 2024-08-09 DIAGNOSIS — I25.2 HISTORY OF ACUTE ANTERIOR WALL MI: ICD-10-CM

## 2024-08-09 DIAGNOSIS — Z86.718 H/O DEEP VENOUS THROMBOSIS: ICD-10-CM

## 2024-08-09 DIAGNOSIS — I44.4 LEFT ANTERIOR FASCICULAR BLOCK: ICD-10-CM

## 2024-08-09 DIAGNOSIS — I10 ESSENTIAL HYPERTENSION: ICD-10-CM

## 2024-08-09 DIAGNOSIS — I25.10 CORONARY ARTERY DISEASE INVOLVING NATIVE CORONARY ARTERY OF NATIVE HEART WITHOUT ANGINA PECTORIS: Primary | ICD-10-CM

## 2024-08-09 DIAGNOSIS — E78.5 DYSLIPIDEMIA: ICD-10-CM

## 2024-08-09 DIAGNOSIS — D68.61 ANTIPHOSPHOLIPID SYNDROME (HCC): ICD-10-CM

## 2024-08-09 DIAGNOSIS — Z86.010 H/O COLONOSCOPY WITH POLYPECTOMY: ICD-10-CM

## 2024-08-09 DIAGNOSIS — R35.1 BENIGN PROSTATIC HYPERPLASIA WITH NOCTURIA: ICD-10-CM

## 2024-08-09 DIAGNOSIS — Z98.61 HISTORY OF PTCA: ICD-10-CM

## 2024-08-09 DIAGNOSIS — G47.33 OSA ON CPAP: ICD-10-CM

## 2024-08-09 DIAGNOSIS — I45.10 RBBB (RIGHT BUNDLE BRANCH BLOCK): ICD-10-CM

## 2024-08-09 DIAGNOSIS — Z86.73 H/O: CVA (CEREBROVASCULAR ACCIDENT): ICD-10-CM

## 2024-08-09 DIAGNOSIS — E66.01 MORBID OBESITY DUE TO EXCESS CALORIES (HCC): ICD-10-CM

## 2024-08-09 DIAGNOSIS — Z87.442 HISTORY OF KIDNEY STONES: ICD-10-CM

## 2024-08-09 DIAGNOSIS — N40.1 BENIGN PROSTATIC HYPERPLASIA WITH NOCTURIA: ICD-10-CM

## 2024-08-09 DIAGNOSIS — M17.0 PRIMARY OSTEOARTHRITIS OF BOTH KNEES: ICD-10-CM

## 2024-08-09 DIAGNOSIS — E89.0 H/O TOTAL THYROIDECTOMY: ICD-10-CM

## 2024-08-09 DIAGNOSIS — Z98.890 H/O COLONOSCOPY WITH POLYPECTOMY: ICD-10-CM

## 2024-08-09 PROCEDURE — 1123F ACP DISCUSS/DSCN MKR DOCD: CPT | Performed by: INTERNAL MEDICINE

## 2024-08-09 PROCEDURE — 3078F DIAST BP <80 MM HG: CPT | Performed by: INTERNAL MEDICINE

## 2024-08-09 PROCEDURE — 3017F COLORECTAL CA SCREEN DOC REV: CPT | Performed by: INTERNAL MEDICINE

## 2024-08-09 PROCEDURE — G8417 CALC BMI ABV UP PARAM F/U: HCPCS | Performed by: INTERNAL MEDICINE

## 2024-08-09 PROCEDURE — G8427 DOCREV CUR MEDS BY ELIG CLIN: HCPCS | Performed by: INTERNAL MEDICINE

## 2024-08-09 PROCEDURE — 1036F TOBACCO NON-USER: CPT | Performed by: INTERNAL MEDICINE

## 2024-08-09 PROCEDURE — 3074F SYST BP LT 130 MM HG: CPT | Performed by: INTERNAL MEDICINE

## 2024-08-09 PROCEDURE — 93000 ELECTROCARDIOGRAM COMPLETE: CPT | Performed by: INTERNAL MEDICINE

## 2024-08-09 PROCEDURE — 99214 OFFICE O/P EST MOD 30 MIN: CPT | Performed by: INTERNAL MEDICINE

## 2024-08-09 RX ORDER — ASPIRIN 81 MG/1
81 TABLET, CHEWABLE ORAL DAILY
Qty: 100 TABLET | Refills: 3 | Status: SHIPPED | OUTPATIENT
Start: 2024-08-09

## 2024-08-09 RX ORDER — METOPROLOL SUCCINATE 25 MG/1
25 TABLET, EXTENDED RELEASE ORAL EVERY EVENING
Qty: 90 TABLET | Refills: 3 | Status: SHIPPED | OUTPATIENT
Start: 2024-08-09

## 2024-08-09 RX ORDER — ATORVASTATIN CALCIUM 80 MG/1
80 TABLET, FILM COATED ORAL NIGHTLY
Qty: 90 TABLET | Refills: 3 | Status: SHIPPED | OUTPATIENT
Start: 2024-08-09

## 2024-08-09 RX ORDER — NITROGLYCERIN 0.4 MG/1
TABLET SUBLINGUAL
Qty: 25 TABLET | Refills: 3 | Status: SHIPPED | OUTPATIENT
Start: 2024-08-09

## 2024-08-09 RX ORDER — WARFARIN SODIUM 3 MG/1
TABLET ORAL
COMMUNITY
Start: 2024-07-15

## 2024-08-09 RX ORDER — LISINOPRIL 5 MG/1
5 TABLET ORAL DAILY
Qty: 90 TABLET | Refills: 3 | Status: SHIPPED | OUTPATIENT
Start: 2024-08-09

## 2024-08-09 NOTE — PROGRESS NOTES
OFFICE VISIT        PRIMARY CARE PHYSICIAN:    Jac Waite DO         ALLERGIES / SENSITIVITIES:    Allergies   Allergen Reactions    No Known Allergies           REVIEWED MEDICATIONS:      Current Outpatient Medications:     JANTOVEN 3 MG tablet, Patient taking 6mg 7 days a weeks and M, W, F patient taking 9 mg, Disp: , Rfl:     atorvastatin (LIPITOR) 80 MG tablet, Take 1 tablet by mouth nightly, Disp: 90 tablet, Rfl: 3    lisinopril (PRINIVIL;ZESTRIL) 5 MG tablet, Take 1 tablet by mouth daily, Disp: 90 tablet, Rfl: 3    metoprolol succinate (TOPROL XL) 25 MG extended release tablet, Take 1 tablet by mouth every evening, Disp: 90 tablet, Rfl: 3    nitroGLYCERIN (NITROSTAT) 0.4 MG SL tablet, Dissolve 1 tablet under tongue for chest pain and repeat every 5 min up to max of 3 total doses.  If no relief after 3 doses call 911, Disp: 25 tablet, Rfl: 3    doxazosin (CARDURA) 4 MG tablet, Take 1 tablet by mouth daily, Disp: 30 tablet, Rfl: 3    levothyroxine (SYNTHROID) 200 MCG tablet, Take 1 tablet by mouth daily (Patient taking differently: Take 150 mcg by mouth daily), Disp: 90 tablet, Rfl: 1    Elastic Bandages & Supports (JOBST KNEE HIGH COMPRESSION SM) MISC, Compression stockings 15 to 20 mm knee-high bilateral Dx : Venous Insufficiency, Swelling, Disp: 2 each, Rfl: 2    omeprazole (PRILOSEC) 20 MG delayed release capsule, Take 1 capsule by mouth daily, Disp: , Rfl:     citalopram (CELEXA) 20 MG tablet, Take 1 tablet by mouth nightly, Disp: , Rfl:     aspirin 81 MG chewable tablet, Take 1 tablet by mouth daily, Disp: , Rfl:     Omega-3 Fatty Acids (FISH OIL) 600 MG CAPS, Take 1,200 mg by mouth 2 times daily , Disp: , Rfl:         S: REASON FOR VISIT:    Coronary artery disease.  Maurice is a 68-year-old male with cardiovascular/below.  He was last seen by me in the office on 8/21/2023.  He is not involved in any formal exercise.  He is morbidly obese and has gained more weight.  He however has been stable from

## 2024-10-08 PROBLEM — E89.0 POSTOPERATIVE HYPOTHYROIDISM: Status: ACTIVE | Noted: 2023-07-27

## 2024-10-08 PROBLEM — Z86.39 HX OF THYROID NODULE: Status: ACTIVE | Noted: 2023-01-26

## 2025-05-03 ENCOUNTER — HOSPITAL ENCOUNTER (EMERGENCY)
Age: 70
Discharge: HOME | End: 2025-05-03
Payer: MEDICARE

## 2025-05-03 ENCOUNTER — APPOINTMENT (OUTPATIENT)
Dept: GENERAL RADIOLOGY | Age: 70
End: 2025-05-03
Payer: MEDICARE

## 2025-05-03 VITALS
RESPIRATION RATE: 18 BRPM | TEMPERATURE: 97.3 F | OXYGEN SATURATION: 96 % | SYSTOLIC BLOOD PRESSURE: 127 MMHG | DIASTOLIC BLOOD PRESSURE: 66 MMHG | HEART RATE: 56 BPM

## 2025-05-03 DIAGNOSIS — M25.571 ACUTE RIGHT ANKLE PAIN: ICD-10-CM

## 2025-05-03 DIAGNOSIS — S80.01XA CONTUSION OF RIGHT KNEE, INITIAL ENCOUNTER: ICD-10-CM

## 2025-05-03 DIAGNOSIS — Z79.01 ANTICOAGULATED ON COUMADIN: ICD-10-CM

## 2025-05-03 DIAGNOSIS — S70.11XA HEMATOMA OF RIGHT THIGH, INITIAL ENCOUNTER: Primary | ICD-10-CM

## 2025-05-03 LAB
ANION GAP SERPL CALCULATED.3IONS-SCNC: 8 MMOL/L (ref 7–16)
BASOPHILS # BLD: 0.04 K/UL (ref 0–0.2)
BASOPHILS NFR BLD: 1 % (ref 0–2)
BUN SERPL-MCNC: 20 MG/DL (ref 6–23)
CALCIUM SERPL-MCNC: 9.1 MG/DL (ref 8.6–10.2)
CHLORIDE SERPL-SCNC: 104 MMOL/L (ref 98–107)
CO2 SERPL-SCNC: 29 MMOL/L (ref 22–29)
CREAT SERPL-MCNC: 1.1 MG/DL (ref 0.7–1.2)
EOSINOPHIL # BLD: 0.17 K/UL (ref 0.05–0.5)
EOSINOPHILS RELATIVE PERCENT: 3 % (ref 0–6)
ERYTHROCYTE [DISTWIDTH] IN BLOOD BY AUTOMATED COUNT: 13.2 % (ref 11.5–15)
GFR, ESTIMATED: 77 ML/MIN/1.73M2
GLUCOSE SERPL-MCNC: 131 MG/DL (ref 74–99)
HCT VFR BLD AUTO: 44.3 % (ref 37–54)
HGB BLD-MCNC: 14 G/DL (ref 12.5–16.5)
IMM GRANULOCYTES # BLD AUTO: <0.03 K/UL (ref 0–0.58)
IMM GRANULOCYTES NFR BLD: 0 % (ref 0–5)
INR PPP: 2.9
LYMPHOCYTES NFR BLD: 1.07 K/UL (ref 1.5–4)
LYMPHOCYTES RELATIVE PERCENT: 17 % (ref 20–42)
MCH RBC QN AUTO: 29.5 PG (ref 26–35)
MCHC RBC AUTO-ENTMCNC: 31.6 G/DL (ref 32–34.5)
MCV RBC AUTO: 93.3 FL (ref 80–99.9)
MONOCYTES NFR BLD: 0.72 K/UL (ref 0.1–0.95)
MONOCYTES NFR BLD: 11 % (ref 2–12)
NEUTROPHILS NFR BLD: 69 % (ref 43–80)
NEUTS SEG NFR BLD: 4.44 K/UL (ref 1.8–7.3)
PARTIAL THROMBOPLASTIN TIME: 39.9 SEC (ref 24.5–35.1)
PLATELET # BLD AUTO: 185 K/UL (ref 130–450)
PMV BLD AUTO: 10 FL (ref 7–12)
POTASSIUM SERPL-SCNC: 4.4 MMOL/L (ref 3.5–5)
PROTHROMBIN TIME: 30.9 SEC (ref 9.3–12.4)
RBC # BLD AUTO: 4.75 M/UL (ref 3.8–5.8)
SODIUM SERPL-SCNC: 141 MMOL/L (ref 132–146)
WBC OTHER # BLD: 6.5 K/UL (ref 4.5–11.5)

## 2025-05-03 PROCEDURE — 73564 X-RAY EXAM KNEE 4 OR MORE: CPT

## 2025-05-03 PROCEDURE — 73552 X-RAY EXAM OF FEMUR 2/>: CPT

## 2025-05-03 PROCEDURE — 85730 THROMBOPLASTIN TIME PARTIAL: CPT

## 2025-05-03 PROCEDURE — 99284 EMERGENCY DEPT VISIT MOD MDM: CPT

## 2025-05-03 PROCEDURE — 85610 PROTHROMBIN TIME: CPT

## 2025-05-03 PROCEDURE — 85025 COMPLETE CBC W/AUTO DIFF WBC: CPT

## 2025-05-03 PROCEDURE — 80048 BASIC METABOLIC PNL TOTAL CA: CPT

## 2025-05-03 PROCEDURE — 73610 X-RAY EXAM OF ANKLE: CPT

## 2025-05-03 ASSESSMENT — PAIN - FUNCTIONAL ASSESSMENT: PAIN_FUNCTIONAL_ASSESSMENT: 0-10

## 2025-05-03 ASSESSMENT — PAIN DESCRIPTION - PAIN TYPE: TYPE: ACUTE PAIN

## 2025-05-03 ASSESSMENT — PAIN DESCRIPTION - FREQUENCY: FREQUENCY: CONTINUOUS

## 2025-05-03 ASSESSMENT — PAIN DESCRIPTION - LOCATION: LOCATION: LEG

## 2025-05-03 ASSESSMENT — PAIN DESCRIPTION - DESCRIPTORS: DESCRIPTORS: DISCOMFORT

## 2025-05-03 ASSESSMENT — PAIN DESCRIPTION - ORIENTATION: ORIENTATION: RIGHT

## 2025-05-03 ASSESSMENT — PAIN DESCRIPTION - ONSET: ONSET: ON-GOING

## 2025-05-03 NOTE — ED PROVIDER NOTES
Independent MAKSIM Visit           [facility] EMERGENCY DEPARTMENT  ED  Encounter Note  Admit Date/RoomTime: 5/3/2025 11:34 AM  ED Room:   NAME: Sean Mack  : 1955  MRN: [id number]  PCP: Jac Waite DO    CHIEF COMPLAINT     Bleeding/Bruising (Bruising to right upper thigh, is on coumadin. States is becoming \"really dark the past day or 2\" states has a hx of blood clots. States was playing with dog last Saturday and fell onto their knees, did not hit head when fell )    HISTORY OF PRESENT ILLNESS        Sean Mack is a 69 y.o. male who presents to the ED by private vehicle with spouse for right knee and ankle pain and states that he fell really hard on his right knee when his labral doodle got excited last week and jumped on him and states that over the past couple days he feels the color has darkened is worsening and he does not feel that the knee is going to give out and states he really does not hurt.  He takes Coumadin daily for past history of DVTs and daily.  He checked his INR yesterday it was 2.5. The complaint has been persistent and gradually worsening and are moderate in severity.  He denies head injury, loss of consciousness, headache, neck pain, chest pain, shortness of breath, chest pain, palpitations, dizziness, fever, chills,  calf pain, nausea, vomiting or abdominal pain.  Patient was ambulatory in arrival with no assistive devices.      REVIEW OF SYSTEMS     Pertinent positives and negatives are stated within HPI, all other systems reviewed and are negative.    Past Medical History:  has a past medical history of Antiphospholipid syndrome, CAD (coronary artery disease), Cardiomyopathy (HCC), Cerebrovascular disease, DVT (deep venous thrombosis) (HCC), Gastro-esophageal reflux, Hx of blood clots, Insulin resistance, Thyroid nodule, and TIA (transient ischemic attack).  Surgical History:  has a past surgical history that includes hernia repair;  knee surgery (Bilateral); Coronary angioplasty with stent (11/11/2016); US BIOPSY SOFT TISSUE NECK/THORAX (11/03/2020); Thyroidectomy (N/A, 6/12/2023); and Thyroidectomy (N/A, 6/17/2023).  Social History:  reports that he quit smoking about 17 years ago. His smoking use included cigarettes. He started smoking about 54 years ago. He has a 55.5 pack-year smoking history. He has been exposed to tobacco smoke. He has never used smokeless tobacco. He reports that he does not currently use alcohol. He reports that he does not currently use drugs after having used the following drugs: Marijuana (Weed) and Cocaine.  Family History: family history includes Alzheimer's Disease in his mother; No Known Problems in his brother, brother, and sister; Other in his daughter; Pancreatic Cancer in his father.   Allergies: No known allergies  CURRENT MEDICATIONS       Discharge Medication List as of 5/3/2025  2:00 PM        CONTINUE these medications which have NOT CHANGED    Details   levothyroxine (SYNTHROID) 137 MCG tablet Take 1 tablet by mouth dailyHistorical Med      vitamin C (ASCORBIC ACID) 500 MG tablet Take 1 tablet by mouth dailyHistorical Med      JANTOVEN 3 MG tablet Patient taking 6mg 7 days a weeks and M, W, F patient taking 9 mg, DAWHistorical Med      atorvastatin (LIPITOR) 80 MG tablet Take 1 tablet by mouth nightly, Disp-90 tablet, R-3Normal      lisinopril (PRINIVIL;ZESTRIL) 5 MG tablet Take 1 tablet by mouth daily, Disp-90 tablet, R-3Normal      metoprolol succinate (TOPROL XL) 25 MG extended release tablet Take 1 tablet by mouth every evening, Disp-90 tablet, R-3Normal      nitroGLYCERIN (NITROSTAT) 0.4 MG SL tablet Dissolve 1 tablet under tongue for chest pain and repeat every 5 min up to max of 3 total doses.  If no relief after 3 doses call 911, Disp-25 tablet, R-3Normal      aspirin 81 MG chewable tablet Take 1 tablet by mouth daily, Disp-100 tablet, R-3Normal      doxazosin (CARDURA) 4 MG tablet Take 1 tablet

## (undated) DEVICE — JP CHAN DRN SIL RND 15FR FULL W/TRO: Brand: JACKSON-PRATT

## (undated) DEVICE — AGENT HEMSTAT W4XL8IN OXIDIZED REGENERATED CELOS ABSRB

## (undated) DEVICE — SPONGE,LAP,4"X18",XR,ST,5/PK,40PK/CS: Brand: MEDLINE INDUSTRIES, INC.

## (undated) DEVICE — MAGNETIC INSTR DRAPE 20X16: Brand: MEDLINE INDUSTRIES, INC.

## (undated) DEVICE — CORD,CAUTERY,BIPOLAR,STERILE: Brand: MEDLINE

## (undated) DEVICE — SHEARS ENDOSCP L9CM CRV HARM FOCS +

## (undated) DEVICE — DOUBLE BASIN SET: Brand: MEDLINE INDUSTRIES, INC.

## (undated) DEVICE — COVER HNDL LT DISP

## (undated) DEVICE — SURGICAL PROCEDURE PACK EENT CUST

## (undated) DEVICE — SYRINGE,CONTROL,LL,FINGER,GRIP: Brand: MEDLINE INDUSTRIES, INC.

## (undated) DEVICE — MARKER,SKIN,WI/RULER AND LABELS: Brand: MEDLINE

## (undated) DEVICE — SPONGE LAP W18XL18IN WHT COT 4 PLY FLD STRUNG RADPQ DISP ST 2 PER PACK

## (undated) DEVICE — PACK PROCEDURE SURG GEN CUST

## (undated) DEVICE — Device

## (undated) DEVICE — ELECTRODE ELECSURG NDL 2.8 INX7.2 CM COAT INSUL EDGE

## (undated) DEVICE — ELECTRODE PT RET AD L9FT HI MOIST COND ADH HYDRGEL CORDED

## (undated) DEVICE — AGENT HEMSTAT 3GM PURIFIED PLNT STARCH PWD ABSRB ARISTA AH

## (undated) DEVICE — DRAIN SURG 15FR SIL RND CHN W/ TRCR FULL FLUT DBL WRP TRAD

## (undated) DEVICE — PROBE 8225101 5PK STD PRASS FL TIP ROHS

## (undated) DEVICE — LIGHT SOURCE WHT

## (undated) DEVICE — DISSECTOR ENDOSCP L21CM TIP CURVATURE 40DEG FN CRV JAW VES

## (undated) DEVICE — TOWEL,OR,DSP,ST,BLUE,STD,6/PK,12PK/CS: Brand: MEDLINE

## (undated) DEVICE — NEEDLE HYPO 25GA L1.5IN BLU POLYPR HUB S STL REG BVL STR

## (undated) DEVICE — SKN PREP SPNG STKS PVP PNT STR: Brand: MEDLINE INDUSTRIES, INC.

## (undated) DEVICE — BLADE ES ELASTOMERIC COAT INSUL DURABLE BEND UPTO 90DEG

## (undated) DEVICE — SYSTEM SURG HEMSTAT PWD 1 GM POLYSACCHARIDE HEMOSPHERES

## (undated) DEVICE — SPONGE,PEANUT,XRAY,ST,SM,3/8",5/CARD: Brand: MEDLINE INDUSTRIES, INC.

## (undated) DEVICE — 3M™ TEGADERM™ TRANSPARENT FILM DRESSING FRAME STYLE, 1626W, 4 IN X 4-3/4 IN (10 CM X 12 CM), 50/CT 4CT/CASE: Brand: 3M™ TEGADERM™

## (undated) DEVICE — BLADE,STAINLESS-STEEL,15,STRL,DISPOSABLE: Brand: MEDLINE

## (undated) DEVICE — GLOVE ORANGE PI 7 1/2   MSG9075

## (undated) DEVICE — TUBING SUCT 12FR MAL ALUM SHFT FN CAP VENT UNIV CONN W/ OBT

## (undated) DEVICE — SKIN PREP TRAY 4 COMPARTM TRAY: Brand: MEDLINE INDUSTRIES, INC.

## (undated) DEVICE — GLOVE ORANGE PI 8 1/2   MSG9085

## (undated) DEVICE — 4-PORT MANIFOLD: Brand: NEPTUNE 2

## (undated) DEVICE — YANKAUER,BULB TIP,W/O VENT,RIGID,STERILE: Brand: MEDLINE